# Patient Record
Sex: MALE | Race: WHITE | NOT HISPANIC OR LATINO | Employment: UNEMPLOYED | URBAN - NONMETROPOLITAN AREA
[De-identification: names, ages, dates, MRNs, and addresses within clinical notes are randomized per-mention and may not be internally consistent; named-entity substitution may affect disease eponyms.]

---

## 2022-12-12 ENCOUNTER — APPOINTMENT (OUTPATIENT)
Dept: RADIOLOGY | Facility: MEDICAL CENTER | Age: 57
End: 2022-12-12

## 2022-12-12 ENCOUNTER — OFFICE VISIT (OUTPATIENT)
Dept: URGENT CARE | Facility: MEDICAL CENTER | Age: 57
End: 2022-12-12

## 2022-12-12 VITALS
OXYGEN SATURATION: 97 % | WEIGHT: 245 LBS | HEIGHT: 69 IN | BODY MASS INDEX: 36.29 KG/M2 | HEART RATE: 81 BPM | RESPIRATION RATE: 18 BRPM | TEMPERATURE: 98.4 F

## 2022-12-12 DIAGNOSIS — M25.532 LEFT WRIST PAIN: ICD-10-CM

## 2022-12-12 DIAGNOSIS — M25.532 LEFT WRIST PAIN: Primary | ICD-10-CM

## 2022-12-12 NOTE — PROGRESS NOTES
[address] [facility]        NAME: Jovany Wan is a 65 y o  male  : 1965    MRN: [id number]  DATE: 2022  TIME: 3:57 PM    Assessment and Plan   Left wrist pain [M25 532]  1  Left wrist pain  XR wrist 3+ vw left            Patient Instructions       Follow up with PCP in 3-5 days  Proceed to  ER if symptoms worsen  Chief Complaint   No chief complaint on file  History of Present Illness       Patient is a 66 y/o/m presenting [facility] with left wrist pain  Patient reports pain began about 2-3 weeks ago  No known injury  No numbness or tingling of fingertips  Pt reports resting does improve pain  Grasping and twisting worsens the pain  Pt does recall spider bite a few months ago same location that was treated  No residual pain  Wrist Pain   The pain is present in the left wrist  This is a new problem  The current episode started 1 to 4 weeks ago  There has been no history of extremity trauma  The problem occurs intermittently  The problem has been gradually worsening  The quality of the pain is described as aching  Associated symptoms include joint swelling and stiffness  Pertinent negatives include no fever, numbness or tingling  Review of Systems   Review of Systems   Constitutional: Negative for chills and fever  HENT: Negative for ear pain and sore throat  Eyes: Negative for pain and visual disturbance  Respiratory: Negative for cough and shortness of breath  Cardiovascular: Negative for chest pain and palpitations  Gastrointestinal: Negative for abdominal pain and vomiting  Genitourinary: Negative for dysuria and hematuria  Musculoskeletal: Positive for arthralgias (Left wrist pain) and stiffness  Negative for back pain  Skin: Negative for color change and rash  Neurological: Negative for tingling, seizures, syncope and numbness  All other systems reviewed and are negative          Current Medications     No current outpatient medications on file     Current Allergies     Allergies as of 12/12/2022 - never reviewed   Allergen Reaction Noted   • Penicillins Rash 12/12/2022            The following portions of the patient's history were reviewed and updated as appropriate: allergies, current medications, past family history, past medical history, past social history, past surgical history and problem list      No past medical history on file  No past surgical history on file  No family history on file  Medications have been verified  Objective   Pulse 81   Temp 98 4 °F (36 9 °C)   Resp 18   Ht 5' 9" (1 753 m)   Wt 111 kg (245 lb)   SpO2 97%   BMI 36 18 kg/m²   No LMP for male patient  Physical Exam     Physical Exam  Constitutional:       Appearance: Normal appearance  He is normal weight  HENT:      Head: Normocephalic and atraumatic  Nose: Nose normal       Mouth/Throat:      Mouth: Mucous membranes are moist    Eyes:      Extraocular Movements: Extraocular movements intact  Conjunctiva/sclera: Conjunctivae normal       Pupils: Pupils are equal, round, and reactive to light  Cardiovascular:      Rate and Rhythm: Normal rate  Pulmonary:      Effort: Pulmonary effort is normal    Musculoskeletal:         General: Normal range of motion  Hands:       Cervical back: Normal range of motion and neck supple  Skin:     General: Skin is warm and dry  Neurological:      General: No focal deficit present  Mental Status: He is alert and oriented to person, place, and time     Psychiatric:         Mood and Affect: Mood normal          Behavior: Behavior normal

## 2022-12-14 ENCOUNTER — TELEPHONE (OUTPATIENT)
Dept: OBGYN CLINIC | Facility: HOSPITAL | Age: 57
End: 2022-12-14

## 2022-12-14 NOTE — TELEPHONE ENCOUNTER
Caller: Brown    Doctor:      Reason for call: Needs appt for left wrist and thumb pain   X rays     Call back#: [phone] or [phone]

## 2022-12-21 ENCOUNTER — OFFICE VISIT (OUTPATIENT)
Dept: OBGYN CLINIC | Facility: CLINIC | Age: 57
End: 2022-12-21

## 2022-12-21 VITALS
WEIGHT: 253 LBS | HEART RATE: 89 BPM | RESPIRATION RATE: 16 BRPM | HEIGHT: 69 IN | SYSTOLIC BLOOD PRESSURE: 153 MMHG | DIASTOLIC BLOOD PRESSURE: 83 MMHG | BODY MASS INDEX: 37.47 KG/M2

## 2022-12-21 DIAGNOSIS — M65.4 RADIAL STYLOID TENOSYNOVITIS (DE QUERVAIN): Primary | ICD-10-CM

## 2022-12-21 RX ORDER — ERGOCALCIFEROL 1.25 MG/1
CAPSULE ORAL WEEKLY
COMMUNITY

## 2022-12-21 RX ORDER — CELECOXIB 200 MG/1
CAPSULE ORAL
COMMUNITY
Start: 2022-12-19

## 2022-12-21 RX ORDER — TRIAMCINOLONE ACETONIDE 40 MG/ML
20 INJECTION, SUSPENSION INTRA-ARTICULAR; INTRAMUSCULAR
Status: COMPLETED | OUTPATIENT
Start: 2022-12-21 | End: 2022-12-21

## 2022-12-21 RX ORDER — SIMVASTATIN 20 MG
20 TABLET ORAL
COMMUNITY

## 2022-12-21 RX ORDER — PREDNISOLONE ACETATE 10 MG/ML
SUSPENSION/ DROPS OPHTHALMIC
COMMUNITY
Start: 2022-11-03

## 2022-12-21 RX ORDER — BESIFLOXACIN 6 MG/ML
SUSPENSION OPHTHALMIC
COMMUNITY
Start: 2022-11-03

## 2022-12-21 RX ORDER — KETOROLAC TROMETHAMINE 5 MG/ML
SOLUTION OPHTHALMIC
COMMUNITY
Start: 2022-12-14

## 2022-12-21 RX ADMIN — TRIAMCINOLONE ACETONIDE 20 MG: 40 INJECTION, SUSPENSION INTRA-ARTICULAR; INTRAMUSCULAR at 11:25

## 2022-12-21 NOTE — PROGRESS NOTES
Chief Complaint   Patient presents with   • Left Wrist - Pain        Subjective:  Jack Andre is a 65 y o  male complains of left wrist pain  Onset of the symptoms was several weeks ago  Mechanism of injury: none  Aggravating factors: bending his wrist, use of hands  Treatment to date: brace which is somewhat effective and prescription NSAIDS which are somewhat effective  Symptoms have progressed to a point and plateaued  The following portions of the patient's history were reviewed and updated as appropriate: allergies, current medications, past family history, past medical history, past social history, past surgical history and problem list     Occupation:      Review of Systems   Constitutional: Negative for fever  Musculoskeletal: Positive for wrist pain  Neuro: Negative for numbness or tingling in arm       Objective:  /83 (BP Location: Left arm, Patient Position: Sitting, Cuff Size: Large)   Pulse 89   Resp 16   Ht 5' 9" (1 753 m)   Wt 115 kg (253 lb)   BMI 37 36 kg/m²     Skin: no rashes, lesions, skin discolorations, lacerations  Vasculature: normal radial and ulnar pulse, normal skin color, normal capillary refill in extremity, no upper extremity edema  Neurologic: Neurologic exam is normal throughout upper extremities, Awake, alert, and oriented x3, no apparent distress     Musculoskeletal:   Left Elbow Examination:                                                 Skin: normal                                            General Appearance: normal                                 Muscle Tone: normal                              Range of Motion  Flexion: full  Extension: full  Supination: full  Pronation: full      Pain with Resisted Motion:   Resisted wrist flexion: negative  Resisted wrist extension: positive   Resisted pronation/supination: positive     +finkelsteins  + radial styloid          Imaging:    XR wrist 3+ vw left    Result Date: 12/13/2022  Narrative: LEFT WRIST INDICATION: M25 532: Pain in left wrist  COMPARISON:  None VIEWS:  XR WRIST 3+ VW LEFT FINDINGS: There is no acute fracture or dislocation  Degenerative changes of the 1st carpal metacarpal [place]) articulation  No lytic or blastic osseous lesion  Soft tissues are unremarkable  Impression: No acute osseous abnormality  Workstation performed: [id number]        Assessment/Plan:    1  Radial styloid tenosynovitis (de quervain)  Radiographs of the left wrist reviewed independently with patient revealing no acute fractures or dislocations  Patient does have degenerative changes noted at the first [facility] joint of the left hand  Additionally there is seems to be a small metallic substance situated in the soft tissue adjacent to the [facility]  Clinical impression the patient's pain symptoms and exam are consistent with de Quervain's tenosynovitis of the left wrist as evidenced by positive Finkelstein's and pain over the radial styloid  We discussed treatment options including thumb spica brace, oral anti-inflammatories, physical therapy and consideration for corticosteroid injection  After discussion patient would like to proceed with corticosteroid injection to first extensor compartment for pain relief  I did advise that metallic substance seen in the soft tissue of the left hand is not likely causing his pain symptoms however if pain symptoms persist can consult with hand surgery for possible removal   Continue in thumb spica brace and take Celebrex as needed for pain relief  Follow-up in 3 months  - Ambulatory Referral to Physical Therapy; Future    Hand/upper extremity injection: L extensor compartment 1  Universal Protocol:  Consent: Verbal consent obtained    Consent given by: patient    Supporting Documentation  Indications: joint swelling, pain and therapeutic   Procedure Details  Condition:de Quervain's tenosynovitis Site: L extensor compartment 1   Needle size: 25 G  Ultrasound guidance: yes  Approach: dorsal  Medications administered: 20 mg triamcinolone acetonide 40 mg/mL (1 ml ropivicaine)    Patient tolerance: patient tolerated the procedure well with no immediate complications    Risks and benefits of CSI were discussed with patient extensively  Risks were highlighted which included but were not limited to infection, pain, local site swelling, and chance that injection may not be effective  Patient was also counseled regarding glucose elevation days after receiving CSI and to be mindful of diet and check sugars daily  Patient agreeable to proceed with CSI after counseling

## 2023-01-25 ENCOUNTER — OFFICE VISIT (OUTPATIENT)
Dept: OBGYN CLINIC | Facility: CLINIC | Age: 58
End: 2023-01-25

## 2023-01-25 VITALS
HEART RATE: 80 BPM | SYSTOLIC BLOOD PRESSURE: 155 MMHG | BODY MASS INDEX: 37.77 KG/M2 | HEIGHT: 69 IN | WEIGHT: 255 LBS | DIASTOLIC BLOOD PRESSURE: 99 MMHG

## 2023-01-25 DIAGNOSIS — M65.4 RADIAL STYLOID TENOSYNOVITIS (DE QUERVAIN): Primary | ICD-10-CM

## 2023-01-25 NOTE — PROGRESS NOTES
Chief Complaint   Patient presents with   • Left Wrist - Follow-up        Subjective:  Diamond Rob is a 65 y o  male complains of left wrist pain  Onset of the symptoms was several weeks ago  Mechanism of injury: none  Aggravating factors: none  Treatment to date: brace which is somewhat effective, corticosteroid injection which was effective and prescription NSAIDS which are somewhat effective  Symptoms have essentially resolved  The following portions of the patient's history were reviewed and updated as appropriate: allergies, current medications, past family history, past medical history, past social history, past surgical history and problem list     Occupation:      Review of Systems   Constitutional: Negative for fever  Musculoskeletal: negative for wrist pain  Neuro: Negative for numbness or tingling in arm       Objective:  /99 (BP Location: Left arm, Patient Position: Sitting, Cuff Size: Large)   Pulse 80   Ht 5' 9" (1 753 m)   Wt 116 kg (255 lb)   BMI 37 66 kg/m²     Skin: no rashes, lesions, skin discolorations, lacerations  Vasculature: normal radial and ulnar pulse, normal skin color, normal capillary refill in extremity, no upper extremity edema  Neurologic: Neurologic exam is normal throughout upper extremities, Awake, alert, and oriented x3, no apparent distress     Musculoskeletal:   Left Elbow Examination:                                                 Skin: normal                                            General Appearance: normal                                 Muscle Tone: normal                              Range of Motion  Flexion: full  Extension: full  Supination: full  Pronation: full      Pain with Resisted Motion:   Resisted wrist flexion: negative  Resisted wrist extension: negative   Resisted pronation/supination: negative     - finkelsteins  - radial styloid          Imaging:    XR wrist 3+ vw left    Result Date: 12/13/2022  Narrative: LEFT WRIST INDICATION: M25 532: Pain in left wrist  COMPARISON:  None VIEWS:  XR WRIST 3+ VW LEFT FINDINGS: There is no acute fracture or dislocation  Degenerative changes of the 1st carpal metacarpal [place]) articulation  No lytic or blastic osseous lesion  Soft tissues are unremarkable  Impression: No acute osseous abnormality  Workstation performed: [id number]        Assessment/Plan:    1  Radial styloid tenosynovitis (de quervain)    Patient is improved in terms of symptom severity in regards to de Quervain's tenosynovitis following corticosteroid injection in office last visit  We discussed continuation of thumb spica brace with activity to help prevent recurrence of tenosynovitis  Advised patient that he can follow-up as needed if symptoms recur

## 2023-01-27 ENCOUNTER — OFFICE VISIT (OUTPATIENT)
Dept: URGENT CARE | Facility: MEDICAL CENTER | Age: 58
End: 2023-01-27

## 2023-01-27 VITALS
BODY MASS INDEX: 36.73 KG/M2 | TEMPERATURE: 98.3 F | DIASTOLIC BLOOD PRESSURE: 82 MMHG | SYSTOLIC BLOOD PRESSURE: 120 MMHG | WEIGHT: 248 LBS | HEART RATE: 106 BPM | HEIGHT: 69 IN | RESPIRATION RATE: 20 BRPM | OXYGEN SATURATION: 98 %

## 2023-01-27 DIAGNOSIS — S29.011A PECTORALIS MUSCLE STRAIN, INITIAL ENCOUNTER: Primary | ICD-10-CM

## 2023-01-27 DIAGNOSIS — R07.9 LEFT-SIDED CHEST PAIN: ICD-10-CM

## 2023-01-27 NOTE — PROGRESS NOTES
[facility]        NAME: Romayne Dunnings is a 65 y o  male  : 1965    MRN: [id number]  DATE: 2023  TIME: 1:55 PM    Assessment and Plan   Pectoralis muscle strain, initial encounter [R09 656V]  1  Pectoralis muscle strain, initial encounter        2  Left-sided chest pain  ECG 12 lead            Patient Instructions       Follow up with PCP in 3-5 days  Proceed to  ER if symptoms worsen  Chief Complaint     Chief Complaint   Patient presents with   • Chest Pain     Pain to left side of chest under arm, pt  States "heaviness" started 1 week ago, occasional lightheadedness, denies sob or diaphoresis, intermittent, worse in the morning, unable to say if pain gets worse with exertion, pain does appear even at rest         History of Present Illness       Patient presents with a 1 week hx of left chest pain (points to left axilla)  Wakes up with pain  Not associated with SOB or diaphoresis  Pain does not exacerbate with activity and comes and goes  No significant FHx of CAD  Mother had a valve replacement due to rheumatic fever  Review of Systems   Review of Systems   Constitutional: Negative for chills and fever  Respiratory: Negative for cough and shortness of breath  Cardiovascular: Negative for palpitations  Gastrointestinal: Negative for nausea and vomiting  Skin: Negative for rash  Current Medications       Current Outpatient Medications:   •  celecoxib (CeleBREX) 200 mg capsule, Take 1 capsule (200 mg total) by mouth 2 (two) times a day as needed for mild pain (pain score 1-3)  , Disp: , Rfl:   •  simvastatin (ZOCOR) 20 mg tablet, Take 20 mg by mouth daily at bedtime, Disp: , Rfl:   •  Vitamin D, Ergocalciferol, 57052 units CAPS, Take by mouth once a week, Disp: , Rfl:   •  Besivance 0 6 % SUSP, Instill ONE drop in left eye three times a day; START TWO DAYS BEFORE SURGERY (Patient not taking: Reported on 2023), Disp: , Rfl:   •  ketorolac (ACULAR) 0 5 % ophthalmic solution, Instill 1 drop in right eye four times a day; start 2 days prior to surgery (Patient not taking: Reported on 1/27/2023), Disp: , Rfl:   •  Pred Forte 1 % ophthalmic suspension, Instill ONE drop in left eye four times a day; START AFTER SURGERY (Patient not taking: Reported on 1/27/2023), Disp: , Rfl:     Current Allergies     Allergies as of 01/27/2023 - Reviewed 01/27/2023   Allergen Reaction Noted   • Bee venom Swelling 09/25/2019   • Penicillins Rash 12/12/2022            The following portions of the patient's history were reviewed and updated as appropriate: allergies, current medications, past family history, past medical history, past social history, past surgical history and problem list      Past Medical History:   Diagnosis Date   • Hypercholesterolemia    • Vitamin deficiency        Past Surgical History:   Procedure Laterality Date   • BARIATRIC SURGERY  03/01/2021   • CATARACT EXTRACTION, BILATERAL         History reviewed  No pertinent family history  Medications have been verified  Objective   /82   Pulse (!) 106   Temp 98 3 °F (36 8 °C)   Resp 20   Ht 5' 9" (1 753 m)   Wt 112 kg (248 lb)   SpO2 98%   BMI 36 62 kg/m²   No LMP for male patient  Physical Exam     Physical Exam  Vitals and nursing note reviewed  Constitutional:       Appearance: He is well-developed  HENT:      Head: Normocephalic and atraumatic  Cardiovascular:      Rate and Rhythm: Normal rate and regular rhythm  Heart sounds: Normal heart sounds  Pulmonary:      Effort: Pulmonary effort is normal       Breath sounds: Normal breath sounds  Chest:       Skin:     General: Skin is warm  Findings: No rash  Neurological:      Mental Status: He is alert  Patient advised to go to the ER should pain worsens changes in any way

## 2023-01-31 LAB
ATRIAL RATE: 96 BPM
P AXIS: 50 DEGREES
PR INTERVAL: 134 MS
QRS AXIS: 40 DEGREES
QRSD INTERVAL: 88 MS
QT INTERVAL: 356 MS
QTC INTERVAL: 449 MS
T WAVE AXIS: 36 DEGREES
VENTRICULAR RATE: 96 BPM

## 2023-02-02 ENCOUNTER — OFFICE VISIT (OUTPATIENT)
Dept: FAMILY MEDICINE CLINIC | Facility: CLINIC | Age: 58
End: 2023-02-02

## 2023-02-02 VITALS
HEIGHT: 69 IN | HEART RATE: 83 BPM | WEIGHT: 251.2 LBS | TEMPERATURE: 96.4 F | OXYGEN SATURATION: 98 % | DIASTOLIC BLOOD PRESSURE: 90 MMHG | SYSTOLIC BLOOD PRESSURE: 118 MMHG | BODY MASS INDEX: 37.2 KG/M2

## 2023-02-02 DIAGNOSIS — M19.90 ARTHRITIS: ICD-10-CM

## 2023-02-02 DIAGNOSIS — I10 ESSENTIAL HYPERTENSION: Primary | ICD-10-CM

## 2023-02-02 DIAGNOSIS — Z12.5 PROSTATE CANCER SCREENING: ICD-10-CM

## 2023-02-02 DIAGNOSIS — R07.9 LEFT-SIDED CHEST PAIN: ICD-10-CM

## 2023-02-02 DIAGNOSIS — E78.2 MIXED HYPERLIPIDEMIA: ICD-10-CM

## 2023-02-02 DIAGNOSIS — Z90.3 S/P GASTRIC SLEEVE PROCEDURE: ICD-10-CM

## 2023-02-02 DIAGNOSIS — Z86.39 HISTORY OF DIABETES MELLITUS: ICD-10-CM

## 2023-02-02 RX ORDER — SIMVASTATIN 20 MG
20 TABLET ORAL
Qty: 90 TABLET | Refills: 3 | Status: SHIPPED | OUTPATIENT
Start: 2023-02-02

## 2023-02-02 RX ORDER — ERGOCALCIFEROL 1.25 MG/1
1 CAPSULE ORAL WEEKLY
Qty: 12 CAPSULE | Refills: 3 | Status: SHIPPED | OUTPATIENT
Start: 2023-02-02

## 2023-02-02 RX ORDER — CELECOXIB 200 MG/1
200 CAPSULE ORAL 2 TIMES DAILY PRN
Qty: 180 CAPSULE | Refills: 1 | Status: SHIPPED | OUTPATIENT
Start: 2023-02-02

## 2023-02-02 NOTE — PROGRESS NOTES
Name: Thao Coulter      : 1965      MRN: [id number]  Encounter Provider: Gunner Kapadia DO  Encounter Date: 2023   Encounter department: [address] [place] [address]   Lab work ordered along with a cardiac stress test   I refilled his medications  I told patient to try taking the Celebrex only if necessary  If he finds it is not helpful, he should stop it  I will see him back in the next 2 months or as needed  1  Essential hypertension  -     CBC and differential  -     Comprehensive metabolic panel  -     TSH, 3rd generation with Free T4 reflex  -     Stress test only, exercise; Future; Expected date: 2023    2  Arthritis  -     celecoxib (CeleBREX) 200 mg capsule; Take 1 capsule (200 mg total) by mouth 2 (two) times a day as needed for moderate pain    3  Mixed hyperlipidemia  -     simvastatin (ZOCOR) 20 mg tablet; Take 1 tablet (20 mg total) by mouth daily at bedtime  -     Comprehensive metabolic panel  -     Lipid Panel with Direct LDL reflex  -     Stress test only, exercise; Future; Expected date: 2023    4  S/P gastric sleeve procedure  -     Vitamin D, Ergocalciferol, 85562 units CAPS; Take 1 capsule by mouth once a week  -     Vitamin D 25 hydroxy    5  Left-sided chest pain  -     Stress test only, exercise; Future; Expected date: 2023    6  BMI 37 0-37 9, adult  -     TSH, 3rd generation with Free T4 reflex  -     Hemoglobin A1C    7  Prostate cancer screening  -     PSA, Total Screen    8  History of diabetes mellitus  -     Hemoglobin A1C  -     Microalbumin / creatinine urine ratio    BMI Counseling: Body mass index is 37 1 kg/m²   The BMI is above normal  Nutrition recommendations include decreasing portion sizes, encouraging healthy choices of fruits and vegetables, decreasing fast food intake, consuming healthier snacks, limiting drinks that contain sugar, moderation in carbohydrate intake, increasing intake of lean protein, reducing intake of saturated and trans fat and reducing intake of cholesterol  No pharmacotherapy was ordered  Rationale for BMI follow-up plan is due to patient being overweight or obese  Depression Screening and Follow-up Plan: Patient was screened for depression during today's encounter  They screened negative with a PHQ-2 score of 0  Subjective     New patient  Patient has a history of diabetes  This has been controlled since he had the gastric sleeve surgery  Has a history of hypertension, again this is generally controlled since he had the gastric bypass  He has a history of hyperlipidemia, on simvastatin for that  Has history of arthritic pain in his left hip, takes Celebrex for that  He is not sure if it is helpful  The hip pain is intermittent  Recently patient had left-sided chest discomfort  He describes it as a pressure  Patient went to urgent care for this, EKG did not show any acute changes  Patient did have a stress test about 4 years ago, which he states was okay  Review of Systems   Constitutional: Negative  Respiratory: Negative  Cardiovascular: Positive for chest pain  Negative for palpitations and leg swelling  Gastrointestinal: Negative  Genitourinary: Negative  Musculoskeletal: Positive for arthralgias         Past Medical History:   Diagnosis Date   • Hypercholesterolemia    • Vitamin deficiency      Past Surgical History:   Procedure Laterality Date   • BARIATRIC SURGERY  03/01/2021   • CATARACT EXTRACTION, BILATERAL       Family History   Problem Relation Age of Onset   • Heart Valve Disease Mother      Social History     Socioeconomic History   • Marital status: Single     Spouse name: None   • Number of children: None   • Years of education: None   • Highest education level: None   Occupational History   • None   Tobacco Use   • Smoking status: Never     Passive exposure: Never   • Smokeless tobacco: Never   Vaping Use   • Vaping Use: Never used   Substance and Sexual Activity   • Alcohol use: Yes     Comment: occasionally   • Drug use: Never   • Sexual activity: Yes     Partners: Female   Other Topics Concern   • None   Social History Narrative   • None     Social Determinants of Health     Financial Resource Strain: Not on file   Food Insecurity: Not on file   Transportation Needs: Not on file   Physical Activity: Not on file   Stress: Not on file   Social Connections: Not on file   Intimate Partner Violence: Not on file   Housing Stability: Not on file     Current Outpatient Medications on File Prior to Visit   Medication Sig   • [DISCONTINUED] celecoxib (CeleBREX) 200 mg capsule Take 1 capsule (200 mg total) by mouth 2 (two) times a day as needed for mild pain (pain score 1-3)     • [DISCONTINUED] simvastatin (ZOCOR) 20 mg tablet Take 20 mg by mouth daily at bedtime   • [DISCONTINUED] Vitamin D, Ergocalciferol, 79473 units CAPS Take by mouth once a week   • [DISCONTINUED] Besivance 0 6 % SUSP Instill ONE drop in left eye three times a day; START TWO DAYS BEFORE SURGERY (Patient not taking: Reported on 1/27/2023)   • [DISCONTINUED] ketorolac (ACULAR) 0 5 % ophthalmic solution Instill 1 drop in right eye four times a day; start 2 days prior to surgery (Patient not taking: Reported on 1/27/2023)   • [DISCONTINUED] Pred Forte 1 % ophthalmic suspension Instill ONE drop in left eye four times a day; START AFTER SURGERY (Patient not taking: Reported on 1/27/2023)     Allergies   Allergen Reactions   • Bee Venom Swelling     As child   • Penicillins Rash     Immunization History   Administered Date(s) Administered   • COVID-19 MODERNA VACC 0 5 ML IM 04/02/2021, 04/30/2021   • INFLUENZA 03/02/2021, 11/08/2021, 09/28/2022   • Tdap 06/22/2022       Objective     /90 (BP Location: Left arm, Patient Position: Sitting, Cuff Size: Large)   Pulse 83   Temp (!) 96 4 °F (35 8 °C)   Ht 5' 9" (1 753 m)   Wt 114 kg (251 lb 3 2 oz)   SpO2 98%   BMI 37 10 kg/m²     Physical Exam  Vitals reviewed  Constitutional:       General: He is not in acute distress  Appearance: Normal appearance  He is well-developed  He is not ill-appearing, toxic-appearing or diaphoretic  HENT:      Head: Normocephalic and atraumatic  Eyes:      Conjunctiva/sclera: Conjunctivae normal    Cardiovascular:      Rate and Rhythm: Normal rate and regular rhythm  Heart sounds: Normal heart sounds  No murmur heard  No friction rub  No gallop  Pulmonary:      Effort: Pulmonary effort is normal  No respiratory distress  Breath sounds: Normal breath sounds  No wheezing, rhonchi or rales  Musculoskeletal:      Right lower leg: No edema  Left lower leg: No edema  Neurological:      General: No focal deficit present  Mental Status: He is alert and oriented to person, place, and time  Psychiatric:         Mood and Affect: Mood normal          Behavior: Behavior normal          Thought Content: Thought content normal          Judgment: Judgment normal        Milady Fowler DO  BMI Counseling: Body mass index is 37 1 kg/m²  The BMI is above normal  Nutrition recommendations include reducing portion sizes, decreasing overall calorie intake, 3-5 servings of fruits/vegetables daily, reducing fast food intake, consuming healthier snacks, decreasing soda and/or juice intake, moderation in carbohydrate intake, increasing intake of lean protein, reducing intake of saturated fat and trans fat and reducing intake of cholesterol

## 2023-02-02 NOTE — PATIENT INSTRUCTIONS

## 2023-02-08 ENCOUNTER — LAB (OUTPATIENT)
Dept: LAB | Facility: HOSPITAL | Age: 58
End: 2023-02-08

## 2023-02-08 ENCOUNTER — HOSPITAL ENCOUNTER (OUTPATIENT)
Dept: NON INVASIVE DIAGNOSTICS | Facility: HOSPITAL | Age: 58
Discharge: HOME | End: 2023-02-08

## 2023-02-08 VITALS
BODY MASS INDEX: 37.18 KG/M2 | SYSTOLIC BLOOD PRESSURE: 130 MMHG | WEIGHT: 251 LBS | HEIGHT: 69 IN | DIASTOLIC BLOOD PRESSURE: 90 MMHG | HEART RATE: 84 BPM

## 2023-02-08 DIAGNOSIS — I10 ESSENTIAL HYPERTENSION: ICD-10-CM

## 2023-02-08 DIAGNOSIS — R07.9 LEFT-SIDED CHEST PAIN: ICD-10-CM

## 2023-02-08 DIAGNOSIS — E78.2 MIXED HYPERLIPIDEMIA: ICD-10-CM

## 2023-02-08 LAB
25(OH)D3 SERPL-MCNC: 38.2 NG/ML (ref 30–100)
ALBUMIN SERPL BCP-MCNC: 4.2 G/DL (ref 3.5–5)
ALP SERPL-CCNC: 62 U/L (ref 34–104)
ALT SERPL W P-5'-P-CCNC: 16 U/L (ref 7–52)
ANION GAP SERPL CALCULATED.3IONS-SCNC: 15 MMOL/L (ref 4–13)
AST SERPL W P-5'-P-CCNC: 14 U/L (ref 13–39)
BASOPHILS # BLD AUTO: 0.06 THOUSANDS/ÂΜL (ref 0–0.1)
BASOPHILS NFR BLD AUTO: 1 % (ref 0–1)
BILIRUB SERPL-MCNC: 0.96 MG/DL (ref 0.2–1)
BUN SERPL-MCNC: 16 MG/DL (ref 5–25)
CALCIUM SERPL-MCNC: 9.3 MG/DL (ref 8.4–10.2)
CHLORIDE SERPL-SCNC: 102 MMOL/L (ref 96–108)
CHOLEST SERPL-MCNC: 205 MG/DL
CO2 SERPL-SCNC: 20 MMOL/L (ref 21–32)
CREAT SERPL-MCNC: 0.99 MG/DL (ref 0.6–1.3)
CREAT UR-MCNC: 254 MG/DL
EOSINOPHIL # BLD AUTO: 0.32 THOUSAND/ÂΜL (ref 0–0.61)
EOSINOPHIL NFR BLD AUTO: 3 % (ref 0–6)
ERYTHROCYTE [DISTWIDTH] IN BLOOD BY AUTOMATED COUNT: 13.3 % (ref 11.6–15.1)
GFR SERPL CREATININE-BSD FRML MDRD: 84 ML/MIN/1.73SQ M
GLUCOSE P FAST SERPL-MCNC: 102 MG/DL (ref 65–99)
HCT VFR BLD AUTO: 47.7 % (ref 36.5–49.3)
HDLC SERPL-MCNC: 56 MG/DL
HGB BLD-MCNC: 16.3 G/DL (ref 12–17)
IMM GRANULOCYTES # BLD AUTO: 0.05 THOUSAND/UL (ref 0–0.2)
IMM GRANULOCYTES NFR BLD AUTO: 1 % (ref 0–2)
LDLC SERPL CALC-MCNC: 112 MG/DL (ref 0–100)
LYMPHOCYTES # BLD AUTO: 3.14 THOUSANDS/ÂΜL (ref 0.6–4.47)
LYMPHOCYTES NFR BLD AUTO: 29 % (ref 14–44)
MAX HR PERCENT: 91 %
MAX HR: 150 BPM
MCH RBC QN AUTO: 30.9 PG (ref 26.8–34.3)
MCHC RBC AUTO-ENTMCNC: 34.2 G/DL (ref 31.4–37.4)
MCV RBC AUTO: 90 FL (ref 82–98)
MICROALBUMIN UR-MCNC: 34.9 MG/L (ref 0–20)
MICROALBUMIN/CREAT 24H UR: 14 MG/G CREATININE (ref 0–30)
MONOCYTES # BLD AUTO: 0.79 THOUSAND/ÂΜL (ref 0.17–1.22)
MONOCYTES NFR BLD AUTO: 7 % (ref 4–12)
NEUTROPHILS # BLD AUTO: 6.59 THOUSANDS/ÂΜL (ref 1.85–7.62)
NEUTS SEG NFR BLD AUTO: 59 % (ref 43–75)
NRBC BLD AUTO-RTO: 0 /100 WBCS
PLATELET # BLD AUTO: 259 THOUSANDS/UL (ref 149–390)
PMV BLD AUTO: 9.7 FL (ref 8.9–12.7)
POTASSIUM SERPL-SCNC: 4 MMOL/L (ref 3.5–5.3)
PROT SERPL-MCNC: 7.6 G/DL (ref 6.4–8.4)
PSA SERPL-MCNC: 0.4 NG/ML (ref 0–4)
RATE PRESSURE PRODUCT: NORMAL
RBC # BLD AUTO: 5.28 MILLION/UL (ref 3.88–5.62)
SL CV STRESS RECOVERY BP: NORMAL MMHG
SL CV STRESS RECOVERY HR: 92 BPM
SL CV STRESS RECOVERY O2 SAT: 92 %
SL CV STRESS STAGE REACHED: 4
SODIUM SERPL-SCNC: 137 MMOL/L (ref 135–147)
STRESS BASELINE BP: NORMAL MMHG
STRESS BASELINE HR: 84 BPM
STRESS O2 SAT REST: 97 %
STRESS PEAK HR: 150 BPM
STRESS POST ESTIMATED WORKLOAD: 10.2 METS
STRESS POST EXERCISE DUR MIN: 9 MIN
STRESS POST EXERCISE DUR SEC: 6 SEC
STRESS POST O2 SAT PEAK: 97 %
STRESS POST PEAK BP: 146 MMHG
STRESS ST DEPRESSION: 0 MM
TRIGL SERPL-MCNC: 185 MG/DL
TSH SERPL DL<=0.05 MIU/L-ACNC: 1.52 UIU/ML (ref 0.45–4.5)
WBC # BLD AUTO: 10.95 THOUSAND/UL (ref 4.31–10.16)

## 2023-02-10 DIAGNOSIS — R80.9 MICROALBUMINURIA: Primary | ICD-10-CM

## 2023-02-10 LAB
EST. AVERAGE GLUCOSE BLD GHB EST-MCNC: 123 MG/DL
HBA1C MFR BLD: 5.9 %

## 2023-02-10 RX ORDER — LOSARTAN POTASSIUM 25 MG/1
25 TABLET ORAL DAILY
Qty: 30 TABLET | Refills: 5 | Status: SHIPPED | OUTPATIENT
Start: 2023-02-10

## 2023-02-14 ENCOUNTER — TELEPHONE (OUTPATIENT)
Dept: FAMILY MEDICINE CLINIC | Facility: CLINIC | Age: 58
End: 2023-02-14

## 2023-02-14 LAB
CHEST PAIN STATEMENT: NORMAL
MAX DIASTOLIC BP: 82 MMHG
MAX HEART RATE: 150 BPM
MAX PREDICTED HEART RATE: 164 BPM
MAX. SYSTOLIC BP: 146 MMHG
PROTOCOL NAME: NORMAL
REASON FOR TERMINATION: NORMAL
TARGET HR FORMULA: NORMAL
TEST INDICATION: NORMAL
TIME IN EXERCISE PHASE: NORMAL

## 2023-02-14 NOTE — TELEPHONE ENCOUNTER
Pt called for lab results  Gave him results and new instructions  Pt did start the Losartan  Wondering when he should be checked next since starting the Losartan? Please advise

## 2023-02-14 NOTE — TELEPHONE ENCOUNTER
No need to recheck his urine for microalbumin until next year  The losartan should help protect him

## 2023-04-04 ENCOUNTER — OFFICE VISIT (OUTPATIENT)
Dept: FAMILY MEDICINE CLINIC | Facility: CLINIC | Age: 58
End: 2023-04-04

## 2023-04-04 VITALS
OXYGEN SATURATION: 98 % | HEIGHT: 69 IN | BODY MASS INDEX: 37.98 KG/M2 | DIASTOLIC BLOOD PRESSURE: 90 MMHG | TEMPERATURE: 98 F | HEART RATE: 90 BPM | SYSTOLIC BLOOD PRESSURE: 120 MMHG | WEIGHT: 256.4 LBS

## 2023-04-04 DIAGNOSIS — M19.90 ARTHRITIS: ICD-10-CM

## 2023-04-04 DIAGNOSIS — R80.9 MICROALBUMINURIA: ICD-10-CM

## 2023-04-04 DIAGNOSIS — I10 ESSENTIAL HYPERTENSION: Primary | ICD-10-CM

## 2023-04-04 DIAGNOSIS — E78.2 MIXED HYPERLIPIDEMIA: ICD-10-CM

## 2023-04-04 DIAGNOSIS — Z90.3 S/P GASTRIC SLEEVE PROCEDURE: ICD-10-CM

## 2023-04-04 DIAGNOSIS — Z12.11 SCREENING FOR COLON CANCER: ICD-10-CM

## 2023-04-04 RX ORDER — CELECOXIB 200 MG/1
200 CAPSULE ORAL 2 TIMES DAILY PRN
Qty: 180 CAPSULE | Refills: 1 | Status: SHIPPED | OUTPATIENT
Start: 2023-04-04

## 2023-04-04 RX ORDER — SIMVASTATIN 20 MG
20 TABLET ORAL
Qty: 90 TABLET | Refills: 3 | Status: SHIPPED | OUTPATIENT
Start: 2023-04-04

## 2023-04-04 RX ORDER — LOSARTAN POTASSIUM 25 MG/1
25 TABLET ORAL DAILY
Qty: 30 TABLET | Refills: 5 | Status: SHIPPED | OUTPATIENT
Start: 2023-04-04

## 2023-04-04 RX ORDER — ERGOCALCIFEROL 1.25 MG/1
1 CAPSULE ORAL WEEKLY
Qty: 12 CAPSULE | Refills: 3 | Status: SHIPPED | OUTPATIENT
Start: 2023-04-04

## 2023-04-04 NOTE — PROGRESS NOTES
Name: Smiley Clark      : 1965      MRN: [id number]  Encounter Provider: Navdeep Calix DO  Encounter Date: 2023   Encounter department: [address] [place] [address] Road   Patient will continue same medications, continue to watch diet  Reviewed blood work with patient  Patient will follow-up here in 6 months, getting lab work before that visit  1  Essential hypertension    2  Microalbuminuria  -     losartan (COZAAR) 25 mg tablet; Take 1 tablet (25 mg total) by mouth daily  -     Comprehensive metabolic panel; Future; Expected date: 2023  -     Hemoglobin A1C; Future; Expected date: 2023  -     CBC and differential; Future; Expected date: 2023  -     Vitamin D 25 hydroxy; Future; Expected date: 2023    3  Mixed hyperlipidemia  -     simvastatin (ZOCOR) 20 mg tablet; Take 1 tablet (20 mg total) by mouth daily at bedtime  -     Comprehensive metabolic panel; Future; Expected date: 2023  -     Hemoglobin A1C; Future; Expected date: 2023  -     CBC and differential; Future; Expected date: 2023    4  Arthritis  -     celecoxib (CeleBREX) 200 mg capsule; Take 1 capsule (200 mg total) by mouth 2 (two) times a day as needed for moderate pain  -     Comprehensive metabolic panel; Future; Expected date: 2023  -     Hemoglobin A1C; Future; Expected date: 2023  -     CBC and differential; Future; Expected date: 2023  -     Vitamin D 25 hydroxy; Future; Expected date: 2023    5  S/P gastric sleeve procedure  -     Vitamin D, Ergocalciferol, 87646 units CAPS; Take 1 capsule by mouth once a week  -     Comprehensive metabolic panel; Future; Expected date: 2023  -     Hemoglobin A1C; Future; Expected date: 2023  -     CBC and differential; Future; Expected date: 2023  -     Vitamin D 25 hydroxy; Future; Expected date: 2023    6   Screening for colon cancer  -     Cologuard         Subjective     Patient here today for follow-up  Patient had a stress test, any past   Patient did try stopping the Celebrex to see if it help with his arthritic pain, he found out that it does  He is back on it, and feeling better  Patient tolerating his other medications well  No new concerns  Review of Systems   Constitutional: Negative  Respiratory: Negative  Cardiovascular: Negative  Gastrointestinal: Negative  Genitourinary: Negative          Past Medical History:   Diagnosis Date   • Hypercholesterolemia    • Vitamin deficiency      Past Surgical History:   Procedure Laterality Date   • BARIATRIC SURGERY  03/01/2021   • CATARACT EXTRACTION, BILATERAL       Family History   Problem Relation Age of Onset   • Heart Valve Disease Mother      Social History     Socioeconomic History   • Marital status: Single     Spouse name: None   • Number of children: None   • Years of education: None   • Highest education level: None   Occupational History   • None   Tobacco Use   • Smoking status: Never     Passive exposure: Never   • Smokeless tobacco: Never   Vaping Use   • Vaping Use: Never used   Substance and Sexual Activity   • Alcohol use: Yes     Comment: occasionally   • Drug use: Never   • Sexual activity: Yes     Partners: Female   Other Topics Concern   • None   Social History Narrative   • None     Social Determinants of Health     Financial Resource Strain: Not on file   Food Insecurity: Not on file   Transportation Needs: Not on file   Physical Activity: Not on file   Stress: Not on file   Social Connections: Not on file   Intimate Partner Violence: Not on file   Housing Stability: Not on file     Current Outpatient Medications on File Prior to Visit   Medication Sig   • [DISCONTINUED] celecoxib (CeleBREX) 200 mg capsule Take 1 capsule (200 mg total) by mouth 2 (two) times a day as needed for moderate pain   • [DISCONTINUED] losartan (COZAAR) 25 mg tablet Take 1 tablet (25 mg total) by mouth daily   • [DISCONTINUED] "simvastatin (ZOCOR) 20 mg tablet Take 1 tablet (20 mg total) by mouth daily at bedtime   • [DISCONTINUED] Vitamin D, Ergocalciferol, 52265 units CAPS Take 1 capsule by mouth once a week     Allergies   Allergen Reactions   • Bee Venom Swelling     As child   • Penicillins Rash     Immunization History   Administered Date(s) Administered   • COVID-19 MODERNA VACC 0 5 ML IM 04/02/2021, 04/30/2021   • INFLUENZA 03/02/2021, 11/08/2021, 09/28/2022   • Tdap 06/22/2022       Objective     /90   Pulse 90   Temp 98 °F (36 7 °C)   Ht 5' 9\" (1 753 m)   Wt 116 kg (256 lb 6 4 oz)   SpO2 98%   BMI 37 86 kg/m²     Physical Exam  Vitals reviewed  Constitutional:       General: He is not in acute distress  Appearance: Normal appearance  He is well-developed  He is not ill-appearing, toxic-appearing or diaphoretic  HENT:      Head: Normocephalic and atraumatic  Eyes:      Conjunctiva/sclera: Conjunctivae normal    Cardiovascular:      Rate and Rhythm: Normal rate and regular rhythm  Heart sounds: Normal heart sounds  No murmur heard  No friction rub  No gallop  Pulmonary:      Effort: Pulmonary effort is normal  No respiratory distress  Breath sounds: Normal breath sounds  No wheezing, rhonchi or rales  Musculoskeletal:      Right lower leg: No edema  Left lower leg: No edema  Neurological:      General: No focal deficit present  Mental Status: He is alert and oriented to person, place, and time  Psychiatric:         Mood and Affect: Mood normal          Behavior: Behavior normal          Thought Content:  Thought content normal          Judgment: Judgment normal        Clarisse Dumont,   "

## 2023-05-08 ENCOUNTER — OFFICE VISIT (OUTPATIENT)
Dept: URGENT CARE | Facility: MEDICAL CENTER | Age: 58
End: 2023-05-08

## 2023-05-08 ENCOUNTER — APPOINTMENT (OUTPATIENT)
Dept: RADIOLOGY | Facility: MEDICAL CENTER | Age: 58
End: 2023-05-08

## 2023-05-08 VITALS
OXYGEN SATURATION: 97 % | RESPIRATION RATE: 20 BRPM | TEMPERATURE: 97.7 F | WEIGHT: 257 LBS | HEART RATE: 108 BPM | BODY MASS INDEX: 37.95 KG/M2 | DIASTOLIC BLOOD PRESSURE: 92 MMHG | SYSTOLIC BLOOD PRESSURE: 144 MMHG

## 2023-05-08 DIAGNOSIS — S29.9XXA TRAUMATIC INJURY OF RIB: ICD-10-CM

## 2023-05-08 DIAGNOSIS — S22.32XA CLOSED FRACTURE OF ONE RIB OF LEFT SIDE, INITIAL ENCOUNTER: Primary | ICD-10-CM

## 2023-05-08 RX ORDER — OXYCODONE HYDROCHLORIDE 5 MG/1
5 TABLET ORAL EVERY 6 HOURS PRN
Qty: 8 TABLET | Refills: 0 | Status: SHIPPED | OUTPATIENT
Start: 2023-05-08 | End: 2023-05-10

## 2023-05-08 NOTE — PATIENT INSTRUCTIONS
Broken rib  Pain management  Splinting  Use the incentive spirometer you have at home to help prevent pneumonia

## 2023-05-08 NOTE — PROGRESS NOTES
[facility]        NAME: Ayan Moody is a 65 y o  male  : 1965    MRN: [id number]  DATE: May 8, 2023  TIME: 4:12 PM    Assessment and Plan   Closed fracture of one rib of left side, initial encounter [S22 32XA]  1  Closed fracture of one rib of left side, initial encounter  XR ribs left w pa chest min 3 views    oxyCODONE (Roxicodone) 5 immediate release tablet        xr- fracture of 4th rib   Patient not a candidate for NSAIDs given bariatric surgery  Did recommend over-the-counter lidocaine in addition to Tylenol to help with pain management  Will prescribe short course of oxycodone for severe pain  Patient Instructions     Broken rib  Pain management  Splinting  Use the incentive spirometer you have at home to help prevent pneumonia  Follow up with PCP in 3-5 days  Proceed to  ER if symptoms worsen  Chief Complaint     Chief Complaint   Patient presents with   • Rib Injury     Tripped over dog last night and hit left ribs against dresser  Using Tylenol  Worse with movement  History of Present Illness       Patient is a 72-year-old male who presents to the office today for left-sided rib pain  He states that his dog got under his feet last night he tripped over the dog went to catch himself on his dresser missed the dresser and ended up striking the left side of his rib cage on the dresser  He states he took Tylenol which helped him get to sleep but every time he moved or cough during his sleep he felt pain  He denies any shortness of breath  Review of Systems   Review of Systems   Constitutional: Negative for chills and fever  Respiratory: Positive for cough  Negative for shortness of breath, wheezing and stridor  Cardiovascular: Negative for chest pain and palpitations  All other systems reviewed and are negative          Current Medications       Current Outpatient Medications:   •  celecoxib (CeleBREX) 200 mg capsule, Take 1 capsule (200 mg total) by mouth 2 (two) times a day as needed for moderate pain, Disp: 180 capsule, Rfl: 1  •  losartan (COZAAR) 25 mg tablet, Take 1 tablet (25 mg total) by mouth daily, Disp: 30 tablet, Rfl: 5  •  oxyCODONE (Roxicodone) 5 immediate release tablet, Take 1 tablet (5 mg total) by mouth every 6 (six) hours as needed for severe pain for up to 2 days Max Daily Amount: 20 mg, Disp: 8 tablet, Rfl: 0  •  simvastatin (ZOCOR) 20 mg tablet, Take 1 tablet (20 mg total) by mouth daily at bedtime, Disp: 90 tablet, Rfl: 3  •  Vitamin D, Ergocalciferol, 49884 units CAPS, Take 1 capsule by mouth once a week, Disp: 12 capsule, Rfl: 3    Current Allergies     Allergies as of 05/08/2023 - Reviewed 05/08/2023   Allergen Reaction Noted   • Bee venom Swelling 09/25/2019   • Penicillins Rash 12/12/2022            The following portions of the patient's history were reviewed and updated as appropriate: allergies, current medications, past family history, past medical history, past social history, past surgical history and problem list      Past Medical History:   Diagnosis Date   • Hypercholesterolemia    • Vitamin deficiency        Past Surgical History:   Procedure Laterality Date   • BARIATRIC SURGERY  03/01/2021   • CATARACT EXTRACTION, BILATERAL         Family History   Problem Relation Age of Onset   • Heart Valve Disease Mother          Medications have been verified  Objective   /92   Pulse (!) 108   Temp 97 7 °F (36 5 °C)   Resp 20   Wt 117 kg (257 lb)   SpO2 97%   BMI 37 95 kg/m²        Physical Exam     Physical Exam  Vitals and nursing note reviewed  Constitutional:       General: He is not in acute distress  Appearance: Normal appearance  He is obese  He is not ill-appearing or toxic-appearing  Cardiovascular:      Rate and Rhythm: Normal rate and regular rhythm  Pulses: Normal pulses  Heart sounds: Normal heart sounds     Pulmonary:      Effort: Pulmonary effort is normal       Breath sounds: Normal breath sounds  Chest:      Chest wall: Tenderness present  No mass, lacerations, deformity, swelling, crepitus or edema  There is no dullness to percussion  Comments: No flail chest noted  Musculoskeletal:         General: Tenderness and signs of injury present  Skin:     General: Skin is warm  Capillary Refill: Capillary refill takes less than 2 seconds  Neurological:      General: No focal deficit present  Mental Status: He is alert

## 2023-05-10 ENCOUNTER — TELEPHONE (OUTPATIENT)
Dept: FAMILY MEDICINE CLINIC | Facility: CLINIC | Age: 58
End: 2023-05-10

## 2023-05-10 DIAGNOSIS — S22.32XD CLOSED FRACTURE OF ONE RIB OF LEFT SIDE WITH ROUTINE HEALING, SUBSEQUENT ENCOUNTER: Primary | ICD-10-CM

## 2023-05-10 RX ORDER — TRAMADOL HYDROCHLORIDE 50 MG/1
50 TABLET ORAL EVERY 6 HOURS PRN
Qty: 30 TABLET | Refills: 0 | Status: SHIPPED | OUTPATIENT
Start: 2023-05-10

## 2023-05-10 NOTE — TELEPHONE ENCOUNTER
Was at [facility] and found out he broke a rib (5th one down ) Pt is in a lot of pain  He was given a few Oxycodone 5mg, but he had to take 3 of them to get relief      Asking if you would prescribe him something for the pain or do you need to see him first?

## 2023-05-10 NOTE — TELEPHONE ENCOUNTER
PDMP website reviewed  No red flags  Rx put in for tramadol for pain control  Use a pillow to brace his left side when coughing  Have him call us if his symptoms continue or increase

## 2023-06-15 ENCOUNTER — TELEPHONE (OUTPATIENT)
Dept: FAMILY MEDICINE CLINIC | Facility: CLINIC | Age: 58
End: 2023-06-15

## 2023-06-15 DIAGNOSIS — Z23 ENCOUNTER FOR IMMUNIZATION: Primary | ICD-10-CM

## 2023-06-15 RX ORDER — PNEUMOCOCCAL 20-VALENT CONJUGATE VACCINE 2.2; 2.2; 2.2; 2.2; 2.2; 2.2; 2.2; 2.2; 2.2; 2.2; 2.2; 2.2; 2.2; 2.2; 2.2; 2.2; 4.4; 2.2; 2.2; 2.2 UG/.5ML; UG/.5ML; UG/.5ML; UG/.5ML; UG/.5ML; UG/.5ML; UG/.5ML; UG/.5ML; UG/.5ML; UG/.5ML; UG/.5ML; UG/.5ML; UG/.5ML; UG/.5ML; UG/.5ML; UG/.5ML; UG/.5ML; UG/.5ML; UG/.5ML; UG/.5ML
0.5 INJECTION, SUSPENSION INTRAMUSCULAR ONCE
Qty: 0.5 ML | Refills: 0 | Status: SHIPPED | OUTPATIENT
Start: 2023-06-15 | End: 2023-06-15

## 2023-06-15 NOTE — TELEPHONE ENCOUNTER
Pt was told by pharmacist that he could get a shingle and pneumonia vaccine but it requires a prior auth  He needs a referral from you in order to get this  [facility], [address] [place] [address]

## 2023-06-23 DIAGNOSIS — R80.9 MICROALBUMINURIA: ICD-10-CM

## 2023-06-23 RX ORDER — LOSARTAN POTASSIUM 25 MG/1
25 TABLET ORAL DAILY
Qty: 30 TABLET | Refills: 5 | Status: SHIPPED | OUTPATIENT
Start: 2023-06-23

## 2023-06-26 ENCOUNTER — TELEPHONE (OUTPATIENT)
Dept: FAMILY MEDICINE CLINIC | Facility: CLINIC | Age: 58
End: 2023-06-26

## 2023-06-26 DIAGNOSIS — Z23 ENCOUNTER FOR IMMUNIZATION: Primary | ICD-10-CM

## 2023-06-26 RX ORDER — ZOSTER VACCINE RECOMBINANT, ADJUVANTED 50 MCG/0.5
0.5 KIT INTRAMUSCULAR ONCE
Qty: 1 EACH | Refills: 1 | Status: SHIPPED | OUTPATIENT
Start: 2023-06-26 | End: 2023-06-26

## 2023-06-26 RX ORDER — ZOSTER VACCINE LIVE 19400 [PFU]/.65ML
1 INJECTION, POWDER, LYOPHILIZED, FOR SUSPENSION SUBCUTANEOUS ONCE
Qty: 1 EACH | Refills: 1 | Status: SHIPPED | OUTPATIENT
Start: 2023-06-26 | End: 2023-06-26

## 2023-06-26 NOTE — TELEPHONE ENCOUNTER
Hi, this is [facility] in [facility] [employer]  We're calling in regards to patient Xavier Sandoval's date of birth 1965  You sent in a prescription for Zosta Vax for his shingle shot, but that's no longer available  You have to resend it for Shingrix vaccination  [address] is [phone] to [address]  If you resend the prescription for that, we can get the patient their medication  Any questions please call the pharmacy [phone]  Thank you

## 2023-06-27 ENCOUNTER — OFFICE VISIT (OUTPATIENT)
Dept: PODIATRY | Facility: CLINIC | Age: 58
End: 2023-06-27
Payer: COMMERCIAL

## 2023-06-27 VITALS — BODY MASS INDEX: 38.06 KG/M2 | HEIGHT: 69 IN | WEIGHT: 257 LBS

## 2023-06-27 DIAGNOSIS — B35.1 ONYCHOMYCOSIS: Primary | ICD-10-CM

## 2023-06-27 DIAGNOSIS — S22.32XD CLOSED FRACTURE OF ONE RIB OF LEFT SIDE WITH ROUTINE HEALING, SUBSEQUENT ENCOUNTER: ICD-10-CM

## 2023-06-27 DIAGNOSIS — I73.9 PVD (PERIPHERAL VASCULAR DISEASE) (HCC): ICD-10-CM

## 2023-06-27 PROCEDURE — 11721 DEBRIDE NAIL 6 OR MORE: CPT | Performed by: STUDENT IN AN ORGANIZED HEALTH CARE EDUCATION/TRAINING PROGRAM

## 2023-06-27 PROCEDURE — 99243 OFF/OP CNSLTJ NEW/EST LOW 30: CPT | Performed by: STUDENT IN AN ORGANIZED HEALTH CARE EDUCATION/TRAINING PROGRAM

## 2023-06-27 RX ORDER — TRAMADOL HYDROCHLORIDE 50 MG/1
50 TABLET ORAL EVERY 6 HOURS PRN
Qty: 30 TABLET | Refills: 0 | Status: SHIPPED | OUTPATIENT
Start: 2023-06-27

## 2023-06-27 NOTE — LETTER
June 27, 2023     Liliya SeeDO  [address]    Patient: Lux Huang   YOB: 1965   Date of Visit: 6/27/2023       Dear Dr Ashtyn Santos: Thank you for referring Lux Huang to me for evaluation  Below are my notes for this consultation  If you have questions, please do not hesitate to call me  I look forward to following your patient along with you  Sincerely,        Hallie Carrington DPM        CC: No Recipients    KIKE [facility]  6/27/2023  8:23 AM  Signed  Assessment/Plan:    Diagnoses and all orders for this visit:    Onychomycosis    PVD (peripheral vascular disease) ([address] )          IMPRESSION:  PVD (Q8)  Onychomycosis  H/o NIDDM, A1c 5 9% 2/8/23     PLAN:  I reviewed clinical exam with patient in detail today  I have discussed with the patient the pathophysiology of this diagnosis and reviewed how the examination correlates with this diagnosis  Foot exam performed as below  Patient has significant lower extremity risk due to diminished pulses in the feet and trophic skin changes to the lower extremity including thick toenail, atrophic skin, and decreased hair growth  Debridement of toenails x10  Using nail nipper, jesse, and curette, nails were sharply debrided, reduced in thickness and length  Devitalized nail tissue and fungal debris excised and removed  Patient tolerated well  Discussed proper shoe gear, daily inspections of feet, and general foot health with patient  Patient has Q8  findings and is recommended for at risk foot care every 9-10 weeks  Subjective:     Patient ID: Lux Huang is a 63 y o  male  Rey Hammondcarmen presents to clinic today for nail care and foot check  Notes he was diabetic however made life changes and now is not  A1c 5 9 2/8/23  Denies N/T/B  Notes toenails are long and thick and he cannot reach them due to back pain         The following portions of the patient's history were reviewed and updated as appropriate: "allergies, current medications, past family history, past medical history, past social history, past surgical history and problem list     Review of Systems   Constitutional: Negative for activity change, chills and fever  HENT: Negative  Respiratory: Negative for cough, chest tightness and shortness of breath  Cardiovascular: Negative for chest pain and leg swelling  Endocrine: Negative  Genitourinary: Negative  Musculoskeletal: Positive for back pain  Skin:        Dystrophic toenails   Neurological: Negative  Negative for numbness  Psychiatric/Behavioral: Negative  Negative for agitation and behavioral problems  Objective:      Ht 5' 9\" (1 753 m)   Wt 117 kg (257 lb)   BMI 37 95 kg/m²         Physical Exam  Constitutional:       General: He is not in acute distress  Appearance: Normal appearance  He is not ill-appearing  Cardiovascular:      Comments: Bilateral DP pulses are palpable 1/4  Bilateral PT pulses are nonpalpable  Pedal hair is diminished  Legs to toes warm to cool  Varicosities with mild LE edema noted  Pulmonary:      Effort: No respiratory distress  Musculoskeletal:         General: No tenderness or deformity  Normal range of motion  Skin:     Capillary Refill: Capillary refill takes less than 2 seconds  Comments: B/L LE skin is atrophic - thin, dry and shiny in appearance  No open wounds noted  Toenails x10 are elongated, dystrophic, discolored with nail thickening and subungual debris  Neurological:      General: No focal deficit present  Mental Status: He is alert and oriented to person, place, and time  Comments: Gross sensation to feet intact  Patient denies numbness, tingling and burning to B/L feet  Protective sensation intact     Psychiatric:         Mood and Affect: Mood normal          Behavior: Behavior normal           "

## 2023-06-27 NOTE — PROGRESS NOTES
Assessment/Plan:     Diagnoses and all orders for this visit:    Onychomycosis    [facility]VD [facility] )           IMPRESSION:  · PVD (Q8)  · Onychomycosis  · H/o NIDDM, A1c 5 9% 2/8/23     PLAN:  · I reviewed clinical exam with patient in detail today  I have discussed with the patient the pathophysiology of this diagnosis and reviewed how the examination correlates with this diagnosis  Foot exam performed as below  Patient has significant lower extremity risk due to diminished pulses in the feet and trophic skin changes to the lower extremity including thick toenail, atrophic skin, and decreased hair growth  Debridement of toenails x10  Using nail nipper, jesse, and curette, nails were sharply debrided, reduced in thickness and length  Devitalized nail tissue and fungal debris excised and removed  Patient tolerated well  Discussed proper shoe gear, daily inspections of feet, and general foot health with patient  Patient has Q8  findings and is recommended for at risk foot care every 9-10 weeks  Subjective:      Patient ID: Saran Thomas is a 63 y o  male  [place]ar[place] presents to clinic today for nail care and foot check  Notes he was diabetic however made life changes and now is not  A1c 5 9 2/8/23  Denies N/T/B  Notes toenails are long and thick and he cannot reach them due to back pain  The following portions of the patient's history were reviewed and updated as appropriate: allergies, current medications, past family history, past medical history, past social history, past surgical history and problem list     Review of Systems   Constitutional: Negative for activity change, chills and fever  HENT: Negative  Respiratory: Negative for cough, chest tightness and shortness of breath  Cardiovascular: Negative for chest pain and leg swelling  Endocrine: Negative  Genitourinary: Negative  Musculoskeletal: Positive for back pain     Skin:        Dystrophic toenails "  Neurological: Negative  Negative for numbness  Psychiatric/Behavioral: Negative  Negative for agitation and behavioral problems  Objective:      Ht 5' 9\" (1 753 m)   Wt 117 kg (257 lb)   BMI 37 95 kg/m²          Physical Exam  Constitutional:       General: He is not in acute distress  Appearance: Normal appearance  He is not ill-appearing  Cardiovascular:      Comments: Bilateral DP pulses are palpable 1/4  Bilateral PT pulses are nonpalpable  Pedal hair is diminished  Legs to toes warm to cool  Varicosities with mild LE edema noted  Pulmonary:      Effort: No respiratory distress  Musculoskeletal:         General: No tenderness or deformity  Normal range of motion  Skin:     Capillary Refill: Capillary refill takes less than 2 seconds  Comments: B/L LE skin is atrophic - thin, dry and shiny in appearance  No open wounds noted  Toenails x10 are elongated, dystrophic, discolored with nail thickening and subungual debris  Neurological:      General: No focal deficit present  Mental Status: He is alert and oriented to person, place, and time  Comments: Gross sensation to feet intact  Patient denies numbness, tingling and burning to B/L feet  Protective sensation intact     Psychiatric:         Mood and Affect: Mood normal          Behavior: Behavior normal          "

## 2023-07-27 ENCOUNTER — OFFICE VISIT (OUTPATIENT)
Dept: URGENT CARE | Facility: MEDICAL CENTER | Age: 58
End: 2023-07-27
Payer: COMMERCIAL

## 2023-07-27 VITALS
RESPIRATION RATE: 18 BRPM | SYSTOLIC BLOOD PRESSURE: 122 MMHG | HEIGHT: 69 IN | TEMPERATURE: 98.6 F | BODY MASS INDEX: 38.51 KG/M2 | DIASTOLIC BLOOD PRESSURE: 78 MMHG | WEIGHT: 260 LBS | OXYGEN SATURATION: 96 % | HEART RATE: 83 BPM

## 2023-07-27 DIAGNOSIS — S50.862A INSECT BITE OF LEFT FOREARM, INITIAL ENCOUNTER: Primary | ICD-10-CM
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
## 2023-07-27 PROCEDURE — 99213 OFFICE O/P EST LOW 20 MIN: CPT | Performed by: PHYSICIAN ASSISTANT

## 2023-07-27 RX ORDER — SULFAMETHOXAZOLE AND TRIMETHOPRIM 800; 160 MG/1; MG/1
1 TABLET ORAL EVERY 12 HOURS SCHEDULED
Qty: 20 TABLET | Refills: 0 | Status: SHIPPED | OUTPATIENT
Start: 2023-07-27 | End: 2023-08-06

## 2023-07-27 NOTE — PROGRESS NOTES
[facility]        NAME: Cici Quintero is a 63 y.o. male  : 1965    MRN: [id number]  DATE: 2023  TIME: 9:52 AM    Assessment and Plan   Insect bite of left forearm, initial encounter [Y55.667T, W57. XXXA]  1. Insect bite of left forearm, initial encounter  sulfamethoxazole-trimethoprim (BACTRIM DS) 800-160 mg per tablet            Patient Instructions       Follow up with PCP in 3-5 days. Proceed to  ER if symptoms worsen. Chief Complaint     Chief Complaint   Patient presents with   • Insect Bite     Possible insect bite to left arm. History of Present Illness       Patient is a 64 y/o/m presenting [facility] with a possible spider bite to left forearm. Patient reports noticing this a few days ago. There is swelling surrounding bite izaiah. Patient is uncertain what insect caused this. Pt denies fever or body aches. Insect Bite  This is a new problem. The current episode started in the past 7 days. The problem occurs constantly. The problem has been gradually worsening. Associated symptoms include a rash. Pertinent negatives include no abdominal pain, arthralgias, chest pain, chills, coughing, fever, joint swelling, sore throat, swollen glands, visual change or vomiting. Review of Systems   Review of Systems   Constitutional: Negative for chills and fever. HENT: Negative for ear pain and sore throat. Eyes: Negative for pain and visual disturbance. Respiratory: Negative for cough and shortness of breath. Cardiovascular: Negative for chest pain and palpitations. Gastrointestinal: Negative for abdominal pain and vomiting. Genitourinary: Negative for dysuria and hematuria. Musculoskeletal: Negative for arthralgias, back pain and joint swelling. Skin: Positive for rash and wound. Negative for color change. Neurological: Negative for seizures and syncope. All other systems reviewed and are negative.         Current Medications       Current Outpatient Medications:   •  celecoxib (CeleBREX) 200 mg capsule, Take 1 capsule (200 mg total) by mouth 2 (two) times a day as needed for moderate pain, Disp: 180 capsule, Rfl: 1  •  losartan (COZAAR) 25 mg tablet, Take 1 tablet (25 mg total) by mouth daily, Disp: 30 tablet, Rfl: 5  •  simvastatin (ZOCOR) 20 mg tablet, Take 1 tablet (20 mg total) by mouth daily at bedtime, Disp: 90 tablet, Rfl: 3  •  sulfamethoxazole-trimethoprim (BACTRIM DS) 800-160 mg per tablet, Take 1 tablet by mouth every 12 (twelve) hours for 10 days, Disp: 20 tablet, Rfl: 0  •  traMADol (Ultram) 50 mg tablet, Take 1 tablet (50 mg total) by mouth every 6 (six) hours as needed for severe pain, Disp: 30 tablet, Rfl: 0  •  Vitamin D, Ergocalciferol, 73314 units CAPS, Take 1 capsule by mouth once a week, Disp: 12 capsule, Rfl: 3    Current Allergies     Allergies as of 07/27/2023 - Reviewed 07/27/2023   Allergen Reaction Noted   • Bee venom Swelling 09/25/2019   • Penicillins Rash 12/12/2022            The following portions of the patient's history were reviewed and updated as appropriate: allergies, current medications, past family history, past medical history, past social history, past surgical history and problem list.     Past Medical History:   Diagnosis Date   • Hypercholesterolemia    • Vitamin deficiency        Past Surgical History:   Procedure Laterality Date   • BARIATRIC SURGERY  03/01/2021   • CATARACT EXTRACTION, BILATERAL         Family History   Problem Relation Age of Onset   • Heart Valve Disease Mother          Medications have been verified. Objective   /78   Pulse 83   Temp 98.6 °F (37 °C)   Resp 18   Ht 5' 9" (1.753 m)   Wt 118 kg (260 lb)   SpO2 96%   BMI 38.40 kg/m²   No LMP for male patient. Physical Exam     Physical Exam  Constitutional:       Appearance: Normal appearance. He is normal weight. HENT:      Head: Normocephalic and atraumatic.       Nose: Nose normal.      Mouth/Throat:      Mouth: Mucous membranes are moist.   Eyes:      Extraocular Movements: Extraocular movements intact. Conjunctiva/sclera: Conjunctivae normal.      Pupils: Pupils are equal, round, and reactive to light. Cardiovascular:      Rate and Rhythm: Normal rate. Pulmonary:      Effort: Pulmonary effort is normal.   Musculoskeletal:         General: Normal range of motion. Arms:       Cervical back: Normal range of motion and neck supple. Skin:     General: Skin is warm and dry. Neurological:      General: No focal deficit present. Mental Status: He is alert and oriented to person, place, and time.    Psychiatric:         Mood and Affect: Mood normal.         Behavior: Behavior normal.

## 2023-07-28 DIAGNOSIS — M19.90 ARTHRITIS: ICD-10-CM

## 2023-07-28 RX ORDER — CELECOXIB 200 MG/1
200 CAPSULE ORAL 2 TIMES DAILY PRN
Qty: 180 CAPSULE | Refills: 1 | Status: SHIPPED | OUTPATIENT
Start: 2023-07-28

## 2023-08-14 ENCOUNTER — OFFICE VISIT (OUTPATIENT)
Dept: URGENT CARE | Facility: MEDICAL CENTER | Age: 58
End: 2023-08-14
Payer: COMMERCIAL

## 2023-08-14 VITALS
OXYGEN SATURATION: 98 % | HEART RATE: 97 BPM | BODY MASS INDEX: 37.89 KG/M2 | TEMPERATURE: 97.9 F | RESPIRATION RATE: 20 BRPM | WEIGHT: 256.6 LBS | DIASTOLIC BLOOD PRESSURE: 100 MMHG | SYSTOLIC BLOOD PRESSURE: 142 MMHG
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
## 2023-08-14 DIAGNOSIS — S30.861A INSECT BITE OF ABDOMINAL WALL, INITIAL ENCOUNTER: Primary | ICD-10-CM

## 2023-08-14 DIAGNOSIS — R22.31 SKIN LUMP OF ARM, RIGHT: ICD-10-CM

## 2023-08-14 PROCEDURE — 99213 OFFICE O/P EST LOW 20 MIN: CPT | Performed by: PHYSICIAN ASSISTANT

## 2023-08-14 RX ORDER — MOMETASONE FUROATE 1 MG/G
CREAM TOPICAL DAILY
Qty: 45 G | Refills: 0 | Status: SHIPPED | OUTPATIENT
Start: 2023-08-14

## 2023-08-14 NOTE — PATIENT INSTRUCTIONS
Apply Elocon cream as prescribed (Do not use for longer than 4 weeks)  Wash hands following administration  Avoid scratching area  Topical benadryl cream or calamine lotion during the day  Cool compresses  Keep area clean and dry  Watch for signs of infection  Follow up with PCP in 3-5 days and additionally workup of lump to R arm  Proceed to  ER if symptoms worsen.

## 2023-08-14 NOTE — PROGRESS NOTES
[facility]        NAME: Nafisa Brown is a 63 y.o. male  : 1965    MRN: [id number]  DATE: 2023  TIME: 3:10 PM    Assessment and Plan   Insect bite of abdominal wall, initial encounter [H35.056I, W57. XXXA]  1. Insect bite of abdominal wall, initial encounter  mometasone (ELOCON) 0.1 % cream      2. Skin lump of arm, right          Suspect lipoma. Recommended patient follow-up with PCP for further evaluation. Consider ultrasound. Patient Instructions     Apply Elocon cream as prescribed (Do not use for longer than 4 weeks)  Wash hands following administration  Avoid scratching area  Topical benadryl cream or calamine lotion during the day  Cool compresses  Keep area clean and dry  Watch for signs of infection  Follow up with PCP in 3-5 days and additionally workup of lump to R arm  Proceed to  ER if symptoms worsen. Chief Complaint     Chief Complaint   Patient presents with   • Mass     Right arm. Noticed Saturday   • Insect Bite     Bit on stomach last week. History of Present Illness       Reports insect bite to abdomen x1 week. Reports itching and mild erythema. Denies fever, chills, drainage. Additionally reports nonpainful skin colored mass to right arm x yesterday. Denies injury. Review of Systems   Review of Systems   HENT: Negative. Negative for sore throat and trouble swallowing. Respiratory: Negative for cough and shortness of breath. Skin: Positive for color change. Allergic/Immunologic: Negative for environmental allergies and food allergies.          Current Medications       Current Outpatient Medications:   •  losartan (COZAAR) 25 mg tablet, Take 1 tablet (25 mg total) by mouth daily, Disp: 30 tablet, Rfl: 5  •  mometasone (ELOCON) 0.1 % cream, Apply topically daily, Disp: 45 g, Rfl: 0  •  simvastatin (ZOCOR) 20 mg tablet, Take 1 tablet (20 mg total) by mouth daily at bedtime, Disp: 90 tablet, Rfl: 3  •  traMADol (Ultram) 50 mg tablet, Take 1 tablet (50 mg total) by mouth every 6 (six) hours as needed for severe pain, Disp: 30 tablet, Rfl: 0  •  Vitamin D, Ergocalciferol, 24482 units CAPS, Take 1 capsule by mouth once a week, Disp: 12 capsule, Rfl: 3  •  celecoxib (CeleBREX) 200 mg capsule, Take 1 capsule (200 mg total) by mouth 2 (two) times a day as needed for moderate pain, Disp: 180 capsule, Rfl: 1    Current Allergies     Allergies as of 08/14/2023 - Reviewed 08/14/2023   Allergen Reaction Noted   • Bee venom Swelling 09/25/2019   • Penicillins Rash 12/12/2022            The following portions of the patient's history were reviewed and updated as appropriate: allergies, current medications, past family history, past medical history, past social history, past surgical history and problem list.     Past Medical History:   Diagnosis Date   • Hypercholesterolemia    • Vitamin deficiency        Past Surgical History:   Procedure Laterality Date   • BARIATRIC SURGERY  03/01/2021   • CATARACT EXTRACTION, BILATERAL         Family History   Problem Relation Age of Onset   • Heart Valve Disease Mother          Medications have been verified. Objective   /100   Pulse 97   Temp 97.9 °F (36.6 °C)   Resp 20   Wt 116 kg (256 lb 9.6 oz)   SpO2 98%   BMI 37.89 kg/m²   No LMP for male patient. Physical Exam     Physical Exam  Constitutional:       General: He is not in acute distress. Appearance: He is well-developed. He is not diaphoretic. Cardiovascular:      Rate and Rhythm: Normal rate and regular rhythm. Heart sounds: Normal heart sounds. No murmur heard. No friction rub. No gallop. Pulmonary:      Effort: Pulmonary effort is normal. No respiratory distress. Breath sounds: Normal breath sounds. No wheezing, rhonchi or rales. Skin:     General: Skin is warm. Findings: Erythema present. Neurological:      Mental Status: He is alert.

## 2023-08-15 ENCOUNTER — OFFICE VISIT (OUTPATIENT)
Dept: FAMILY MEDICINE CLINIC | Facility: CLINIC | Age: 58
End: 2023-08-15
Payer: COMMERCIAL

## 2023-08-15 VITALS
WEIGHT: 260.4 LBS | HEIGHT: 69 IN | DIASTOLIC BLOOD PRESSURE: 92 MMHG | TEMPERATURE: 98.4 F | SYSTOLIC BLOOD PRESSURE: 142 MMHG | HEART RATE: 109 BPM | BODY MASS INDEX: 38.57 KG/M2 | OXYGEN SATURATION: 97 %
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
## 2023-08-15 DIAGNOSIS — Z23 ENCOUNTER FOR IMMUNIZATION: ICD-10-CM

## 2023-08-15 DIAGNOSIS — D17.20 LIPOMA OF UPPER ARM: Primary | ICD-10-CM

## 2023-08-15 PROCEDURE — 99214 OFFICE O/P EST MOD 30 MIN: CPT | Performed by: FAMILY MEDICINE

## 2023-08-15 RX ORDER — ZOSTER VACCINE RECOMBINANT, ADJUVANTED 50 MCG/0.5
0.5 KIT INTRAMUSCULAR ONCE
Qty: 1 EACH | Refills: 1 | Status: SHIPPED | OUTPATIENT
Start: 2023-08-15 | End: 2023-08-15

## 2023-08-15 NOTE — PROGRESS NOTES
Name: Verner Caldwell      : 1965      MRN: [id number]  Encounter Provider: Keisha Joe DO  Encounter Date: 8/15/2023   Encounter department: [address]   The lump on his right arm, I will check an ultrasound. His insect bite site is healing, no need for any treatment at this time. Rx put in for Shingrix. Follow-up as scheduled in October, getting lab work before that visit. 1. Lipoma of upper arm  -     US extremity soft tissue; Future; Expected date: 08/15/2023    2. Bug bite, subsequent encounter    3. Encounter for immunization  -     Zoster Vac Recomb Adjuvanted (Shingrix) 50 MCG/0.5ML SUSR; Inject 0.5 mL into a muscle once for 1 dose Repeat dose in 2 to 6 months           Subjective     Patient here today for follow-up. Patient was in the urgent care yesterday for a bug bite on his left lower abdomen. That is doing better, does not bother him anymore. Last week it was itchy. Not sure what he got bit by. Denies any arthralgia or any fever or chills. Also states that a couple weeks ago noticed a lump on his right distal upper arm. It does not hurt him at all. He is just concerned that it is there. Review of Systems   Constitutional: Negative. Respiratory: Negative. Cardiovascular: Negative. Gastrointestinal: Negative. Genitourinary: Negative.         Past Medical History:   Diagnosis Date   • Hypercholesterolemia    • Vitamin deficiency      Past Surgical History:   Procedure Laterality Date   • BARIATRIC SURGERY  2021   • CATARACT EXTRACTION, BILATERAL       Family History   Problem Relation Age of Onset   • Heart Valve Disease Mother      Social History     Socioeconomic History   • Marital status: Single     Spouse name: None   • Number of children: None   • Years of education: None   • Highest education level: None   Occupational History   • None   Tobacco Use   • Smoking status: Never     Passive exposure: Never   • Smokeless tobacco: Never   Vaping Use   • Vaping Use: Never used   Substance and Sexual Activity   • Alcohol use: Yes     Comment: occasionally   • Drug use: Never   • Sexual activity: Yes     Partners: Female   Other Topics Concern   • None   Social History Narrative   • None     Social Determinants of Health     Financial Resource Strain: Not on file   Food Insecurity: Not on file   Transportation Needs: Not on file   Physical Activity: Not on file   Stress: Not on file   Social Connections: Not on file   Intimate Partner Violence: Not on file   Housing Stability: Not on file     Current Outpatient Medications on File Prior to Visit   Medication Sig   • losartan (COZAAR) 25 mg tablet Take 1 tablet (25 mg total) by mouth daily   • mometasone (ELOCON) 0.1 % cream Apply topically daily   • simvastatin (ZOCOR) 20 mg tablet Take 1 tablet (20 mg total) by mouth daily at bedtime   • Vitamin D, Ergocalciferol, 02290 units CAPS Take 1 capsule by mouth once a week   • [DISCONTINUED] celecoxib (CeleBREX) 200 mg capsule Take 1 capsule (200 mg total) by mouth 2 (two) times a day as needed for moderate pain   • [DISCONTINUED] traMADol (Ultram) 50 mg tablet Take 1 tablet (50 mg total) by mouth every 6 (six) hours as needed for severe pain (Patient not taking: Reported on 8/15/2023)     Allergies   Allergen Reactions   • Bee Venom Swelling     As child   • Penicillins Rash     Immunization History   Administered Date(s) Administered   • COVID-19 MODERNA VACC 0.5 ML IM 04/02/2021, 04/30/2021   • INFLUENZA 03/02/2021, 11/08/2021, 09/28/2022   • Tdap 06/22/2022       Objective     /92   Pulse (!) 109   Temp 98.4 °F (36.9 °C)   Ht 5' 9" (1.753 m)   Wt 118 kg (260 lb 6.4 oz)   SpO2 97%   BMI 38.45 kg/m²     Physical Exam  Vitals reviewed. Constitutional:       General: He is not in acute distress. Appearance: Normal appearance. He is well-developed. He is not ill-appearing, toxic-appearing or diaphoretic.    HENT:      Head: Normocephalic and atraumatic. Eyes:      Conjunctiva/sclera: Conjunctivae normal.   Cardiovascular:      Rate and Rhythm: Normal rate and regular rhythm. Heart sounds: Normal heart sounds. No murmur heard. No friction rub. No gallop. Pulmonary:      Effort: Pulmonary effort is normal. No respiratory distress. Breath sounds: Normal breath sounds. No wheezing, rhonchi or rales. Musculoskeletal:         General: Deformity (  Nontender, freely movable soft lump on the distal right upper arm. Approximately 3 x 3 cm) present. Right lower leg: No edema. Left lower leg: No edema. Skin:     Comments: Bug bite on his left lower abdomen is healing, scabbed, no sign of infection. Neurological:      General: No focal deficit present. Mental Status: He is alert and oriented to person, place, and time. Psychiatric:         Mood and Affect: Mood normal.         Behavior: Behavior normal.         Thought Content:  Thought content normal.         Judgment: Judgment normal.       Anna Nickerson,

## 2023-08-18 ENCOUNTER — HOSPITAL ENCOUNTER (OUTPATIENT)
Dept: ULTRASOUND IMAGING | Facility: HOSPITAL | Age: 58
Discharge: HOME | End: 2023-08-18
Payer: COMMERCIAL

## 2023-08-18 DIAGNOSIS — D17.20 LIPOMA OF UPPER ARM: ICD-10-CM

## 2023-08-18 PROCEDURE — 76882 US LMTD JT/FCL EVL NVASC XTR: CPT

## 2023-09-05 ENCOUNTER — OFFICE VISIT (OUTPATIENT)
Dept: PODIATRY | Facility: CLINIC | Age: 58
End: 2023-09-05
Payer: COMMERCIAL

## 2023-09-05 VITALS
HEART RATE: 93 BPM | DIASTOLIC BLOOD PRESSURE: 84 MMHG | HEIGHT: 70 IN | BODY MASS INDEX: 37.37 KG/M2 | OXYGEN SATURATION: 96 % | WEIGHT: 261 LBS | SYSTOLIC BLOOD PRESSURE: 122 MMHG

## 2023-09-05 DIAGNOSIS — B35.1 ONYCHOMYCOSIS: ICD-10-CM

## 2023-09-05 DIAGNOSIS — I73.9 PVD (PERIPHERAL VASCULAR DISEASE) (HCC): Primary | ICD-10-CM

## 2023-09-05 PROCEDURE — 11721 DEBRIDE NAIL 6 OR MORE: CPT | Performed by: STUDENT IN AN ORGANIZED HEALTH CARE EDUCATION/TRAINING PROGRAM

## 2023-09-05 RX ORDER — CELECOXIB 200 MG/1
CAPSULE ORAL
COMMUNITY
Start: 2023-08-23

## 2023-09-05 NOTE — PROGRESS NOTES
Dev Crawford  1965  AT RISK FOOT CARE    1. PVD (peripheral vascular disease) ([address] )        2. Onychomycosis            Patient presents for at-risk foot care. Patient has no acute concerns today. Patient has significant lower extremity risk due to diminished pulses in the feet and trophic skin changes to the lower extremity including thick toenail, atrophic skin, and decreased hair growth. On exam patient has thickened, hypertrophic, discolored, brittle toenails with subungual debris and tenderness x10  Patient has diminished pedal pulses and decreased perfusion to the lower extremities  Patient has significant trophic changes to the skin including thick toenails, decreased pedal hair and atrophic skin. Today's treatment includes:  Debridement of toenails x10. Using nail nipper, jesse, and curette, nails were sharply debrided, reduced in thickness and length. Devitalized nail tissue and fungal debris excised and removed. Patient tolerated well. Discussed proper shoe gear, daily inspections of feet, and general foot health with patient. Patient has Q8  findings and is recommended for at risk foot care every 9-10 weeks.     Patients most recent complete clinical foot exam was on: 6/27/23

## 2023-10-20 ENCOUNTER — OFFICE VISIT (OUTPATIENT)
Dept: FAMILY MEDICINE CLINIC | Facility: CLINIC | Age: 58
End: 2023-10-20
Payer: COMMERCIAL

## 2023-10-20 VITALS
OXYGEN SATURATION: 96 % | WEIGHT: 258.4 LBS | HEART RATE: 81 BPM | HEIGHT: 70 IN | DIASTOLIC BLOOD PRESSURE: 96 MMHG | BODY MASS INDEX: 36.99 KG/M2 | TEMPERATURE: 96.8 F | SYSTOLIC BLOOD PRESSURE: 122 MMHG

## 2023-10-20 DIAGNOSIS — E55.9 VITAMIN D DEFICIENCY: ICD-10-CM

## 2023-10-20 DIAGNOSIS — R20.0 NUMBNESS OF FINGER: ICD-10-CM

## 2023-10-20 DIAGNOSIS — R80.9 MICROALBUMINURIA: ICD-10-CM

## 2023-10-20 DIAGNOSIS — E78.2 MIXED HYPERLIPIDEMIA: ICD-10-CM

## 2023-10-20 DIAGNOSIS — I10 ESSENTIAL HYPERTENSION: Primary | ICD-10-CM

## 2023-10-20 DIAGNOSIS — D17.20 LIPOMA OF UPPER ARM: ICD-10-CM

## 2023-10-20 DIAGNOSIS — Z90.3 S/P GASTRIC SLEEVE PROCEDURE: ICD-10-CM

## 2023-10-20 PROCEDURE — 99214 OFFICE O/P EST MOD 30 MIN: CPT | Performed by: FAMILY MEDICINE

## 2023-10-20 RX ORDER — SIMVASTATIN 20 MG
20 TABLET ORAL
Qty: 90 TABLET | Refills: 3 | Status: SHIPPED | OUTPATIENT
Start: 2023-10-20

## 2023-10-20 RX ORDER — ERGOCALCIFEROL 1.25 MG/1
1 CAPSULE ORAL WEEKLY
Qty: 12 CAPSULE | Refills: 3 | Status: SHIPPED | OUTPATIENT
Start: 2023-10-20

## 2023-10-20 RX ORDER — LOSARTAN POTASSIUM 25 MG/1
25 TABLET ORAL DAILY
Qty: 90 TABLET | Refills: 3 | Status: SHIPPED | OUTPATIENT
Start: 2023-10-20

## 2023-10-20 NOTE — PROGRESS NOTES
Name: Juan F Haynes      : 1965      MRN: [id number]  Encounter Provider: Tobias Valdez DO  Encounter Date: 10/20/2023   Encounter department: [address]   For his right fifth finger numbness, I will refer him to orthopedics. Continue other medications as prescribed. I will see him back in 4 months, getting lab work before that visit. 1. Essential hypertension  -     CBC and differential; Future; Expected date: 2024  -     TSH, 3rd generation with Free T4 reflex; Future; Expected date: 2024    2. Mixed hyperlipidemia  -     Comprehensive metabolic panel; Future; Expected date: 2024  -     Lipid Panel with Direct LDL reflex; Future; Expected date: 2024  -     simvastatin (ZOCOR) 20 mg tablet; Take 1 tablet (20 mg total) by mouth daily at bedtime    3. Numbness of finger  -     Ambulatory Referral to Orthopedic Surgery; Future  -     TSH, 3rd generation with Free T4 reflex; Future; Expected date: 2024    4. Lipoma of upper arm  -     Ambulatory Referral to Orthopedic Surgery; Future  -     TSH, 3rd generation with Free T4 reflex; Future; Expected date: 2024    5. Vitamin D deficiency  -     TSH, 3rd generation with Free T4 reflex; Future; Expected date: 2024  -     Vitamin D 25 hydroxy; Future; Expected date: 2024    6. S/P gastric sleeve procedure  -     Vitamin D, Ergocalciferol, 56929 units CAPS; Take 1 capsule by mouth once a week    7. Microalbuminuria  -     losartan (COZAAR) 25 mg tablet; Take 1 tablet (25 mg total) by mouth daily        Depression Screening and Follow-up Plan: Patient was screened for depression during today's encounter. They screened negative with a PHQ-2 score of 0. Subjective     Patient here today for follow-up. Patient states about a month ago woke up with right fifth finger numbness. Not weak at all. Does have some numbness in the fourth finger as well. Otherwise he is feeling well. No chest pain or shortness of breath      Review of Systems   Constitutional: Negative. Respiratory: Negative. Cardiovascular: Negative. Gastrointestinal: Negative. Genitourinary: Negative.         Past Medical History:   Diagnosis Date   • Hypercholesterolemia    • Vitamin deficiency      Past Surgical History:   Procedure Laterality Date   • BARIATRIC SURGERY  03/01/2021   • CATARACT EXTRACTION, BILATERAL       Family History   Problem Relation Age of Onset   • Heart Valve Disease Mother      Social History     Socioeconomic History   • Marital status: Single     Spouse name: None   • Number of children: None   • Years of education: None   • Highest education level: None   Occupational History   • None   Tobacco Use   • Smoking status: Never     Passive exposure: Never   • Smokeless tobacco: Never   Vaping Use   • Vaping Use: Never used   Substance and Sexual Activity   • Alcohol use: Yes     Comment: occasionally   • Drug use: Never   • Sexual activity: Yes     Partners: Female   Other Topics Concern   • None   Social History Narrative   • None     Social Determinants of Health     Financial Resource Strain: Not on file   Food Insecurity: Not on file   Transportation Needs: Not on file   Physical Activity: Not on file   Stress: Not on file   Social Connections: Not on file   Intimate Partner Violence: Not on file   Housing Stability: Not on file     Current Outpatient Medications on File Prior to Visit   Medication Sig   • celecoxib (CeleBREX) 200 mg capsule Take 1 capsule (200 mg total) by mouth 2 (two) times a day as needed for moderate pain   • [DISCONTINUED] losartan (COZAAR) 25 mg tablet Take 1 tablet (25 mg total) by mouth daily   • [DISCONTINUED] simvastatin (ZOCOR) 20 mg tablet Take 1 tablet (20 mg total) by mouth daily at bedtime   • [DISCONTINUED] Vitamin D, Ergocalciferol, 08641 units CAPS Take 1 capsule by mouth once a week   • [DISCONTINUED] mometasone (ELOCON) 0.1 % cream Apply topically daily (Patient not taking: Reported on 10/20/2023)     Allergies   Allergen Reactions   • Bee Venom Swelling     As child   • Penicillins Rash     Immunization History   Administered Date(s) Administered   • COVID-19 MODERNA VACC 0.5 ML IM 04/02/2021, 04/30/2021   • INFLUENZA 03/02/2021, 11/08/2021, 09/28/2022   • Tdap 06/22/2022       Objective     /96   Pulse 81   Temp (!) 96.8 °F (36 °C)   Ht 5' 10" (1.778 m)   Wt 117 kg (258 lb 6.4 oz)   SpO2 96%   BMI 37.08 kg/m²     Physical Exam  Vitals reviewed. Constitutional:       General: He is not in acute distress. Appearance: Normal appearance. He is well-developed. He is not ill-appearing, toxic-appearing or diaphoretic. HENT:      Head: Normocephalic and atraumatic. Eyes:      Conjunctiva/sclera: Conjunctivae normal.   Cardiovascular:      Rate and Rhythm: Normal rate and regular rhythm. Heart sounds: Normal heart sounds. No murmur heard. No friction rub. No gallop. Pulmonary:      Effort: Pulmonary effort is normal. No respiratory distress. Breath sounds: Normal breath sounds. No wheezing, rhonchi or rales. Musculoskeletal:      Right lower leg: No edema. Left lower leg: No edema. Neurological:      General: No focal deficit present. Mental Status: He is alert and oriented to person, place, and time. Psychiatric:         Mood and Affect: Mood normal.         Behavior: Behavior normal.         Thought Content:  Thought content normal.         Judgment: Judgment normal.       Vibha Cutler, DO

## 2023-11-14 ENCOUNTER — OFFICE VISIT (OUTPATIENT)
Dept: PODIATRY | Facility: CLINIC | Age: 58
End: 2023-11-14
Payer: COMMERCIAL

## 2023-11-14 VITALS
WEIGHT: 260 LBS | DIASTOLIC BLOOD PRESSURE: 90 MMHG | HEIGHT: 70 IN | BODY MASS INDEX: 37.22 KG/M2 | HEART RATE: 80 BPM | SYSTOLIC BLOOD PRESSURE: 128 MMHG | OXYGEN SATURATION: 97 %

## 2023-11-14 DIAGNOSIS — I73.9 PVD (PERIPHERAL VASCULAR DISEASE) (HCC): Primary | ICD-10-CM

## 2023-11-14 DIAGNOSIS — B35.1 ONYCHOMYCOSIS: ICD-10-CM

## 2023-11-14 PROCEDURE — 11721 DEBRIDE NAIL 6 OR MORE: CPT | Performed by: STUDENT IN AN ORGANIZED HEALTH CARE EDUCATION/TRAINING PROGRAM

## 2023-11-14 NOTE — PROGRESS NOTES
Elisa Mcgowan  1965  AT RISK FOOT CARE    1. PVD (peripheral vascular disease) ([address] )        2. Onychomycosis              Patient presents for at-risk foot care. Patient has no acute concerns today. Patient has significant lower extremity risk due to diminished pulses in the feet and trophic skin changes to the lower extremity including thick toenail, atrophic skin, and decreased hair growth. On exam patient has thickened, hypertrophic, discolored, brittle toenails with subungual debris and tenderness x10  Patient has diminished pedal pulses and decreased perfusion to the lower extremities  Patient has significant trophic changes to the skin including thick toenails, decreased pedal hair and atrophic skin. Today's treatment includes:  Debridement of toenails x10. Using nail nipper, jesse, and curette, nails were sharply debrided, reduced in thickness and length. Devitalized nail tissue and fungal debris excised and removed. Patient tolerated well. Discussed proper shoe gear, daily inspections of feet, and general foot health with patient. Patient has Q8  findings and is recommended for at risk foot care every 9-10 weeks.     Patients most recent complete clinical foot exam was on: 6/27/23

## 2023-11-17 ENCOUNTER — OFFICE VISIT (OUTPATIENT)
Dept: OBGYN CLINIC | Facility: CLINIC | Age: 58
End: 2023-11-17
Payer: COMMERCIAL

## 2023-11-17 VITALS
HEART RATE: 77 BPM | BODY MASS INDEX: 37.16 KG/M2 | RESPIRATION RATE: 16 BRPM | DIASTOLIC BLOOD PRESSURE: 93 MMHG | HEIGHT: 70 IN | WEIGHT: 259.6 LBS | SYSTOLIC BLOOD PRESSURE: 157 MMHG

## 2023-11-17 DIAGNOSIS — D17.20 LIPOMA OF UPPER ARM: ICD-10-CM

## 2023-11-17 DIAGNOSIS — R20.0 NUMBNESS OF FINGER: Primary | ICD-10-CM

## 2023-11-17 PROCEDURE — 99213 OFFICE O/P EST LOW 20 MIN: CPT | Performed by: FAMILY MEDICINE

## 2023-11-17 NOTE — PROGRESS NOTES
Assessment/Plan:    No problem-specific Assessment & Plan notes found for this encounter. Diagnoses and all orders for this visit:    Numbness of finger  -     Ambulatory Referral to Orthopedic Surgery  -     EMG 1 Limb; Future    Lipoma of upper arm  -     Ambulatory Referral to Orthopedic Surgery        Clinical impression is that patient likely suffering from cubital tunnel syndrome. Advised to continue with elbow extension splinting at night. We will refer patient for EMG to evaluate for possible cubital tunnel syndrome. In regards to subjective lump sensation I discussed we can continue to monitor. It is reassuring that ultrasound has come back without any abnormal findings for the subjective lump sensation. Subjective:      Patient ID: Giselle Madrid is a 63 y.o. male presenting today for initial evaluation for right hand numbness and tingling. He states that the Tingling has been ongoing for the past month without any inciting injury. Only affects his fourth and fifth digit. He denies any elbow pain. He does note that he has noticed a small lump in the elbow which was referred for ultrasound for evaluation and came back negative. He denies any weakness of the extremity    HPI    The following portions of the patient's history were reviewed and updated as appropriate: allergies, current medications, past family history, past medical history, past social history, past surgical history, and problem list.    Review of Systems      Objective:      /93   Pulse 77   Resp 16   Ht 5' 10" (1.778 m)   Wt 118 kg (259 lb 9.6 oz)   BMI 37.25 kg/m²          Physical Exam  Constitutional:       General: He is not in acute distress. Musculoskeletal:      Right elbow: Normal.      Left elbow: Normal.      Comments:  There are sensory deficits to light touch in the fourth and fifth digits of the right hand  Motor evaluation of the median ulnar and radial nerves with normal limits  Negative Tinel's at the cubital tunnel   Neurological:      Mental Status: He is alert.

## 2023-11-21 ENCOUNTER — VBI (OUTPATIENT)
Dept: ADMINISTRATIVE | Facility: OTHER | Age: 58
End: 2023-11-21

## 2023-12-28 DIAGNOSIS — M19.90 UNSPECIFIED OSTEOARTHRITIS, UNSPECIFIED SITE: ICD-10-CM

## 2023-12-28 RX ORDER — CELECOXIB 200 MG/1
CAPSULE ORAL
Qty: 180 CAPSULE | Refills: 1 | Status: SHIPPED | OUTPATIENT
Start: 2023-12-28

## 2024-01-15 ENCOUNTER — OFFICE VISIT (OUTPATIENT)
Dept: URGENT CARE | Facility: MEDICAL CENTER | Age: 59
End: 2024-01-15
Payer: COMMERCIAL

## 2024-01-15 VITALS
RESPIRATION RATE: 20 BRPM | HEART RATE: 100 BPM | OXYGEN SATURATION: 95 % | TEMPERATURE: 97.7 F | SYSTOLIC BLOOD PRESSURE: 110 MMHG | DIASTOLIC BLOOD PRESSURE: 80 MMHG

## 2024-01-15 DIAGNOSIS — M79.674 GREAT TOE PAIN, RIGHT: Primary | ICD-10-CM

## 2024-01-15 PROCEDURE — 99212 OFFICE O/P EST SF 10 MIN: CPT | Performed by: PHYSICIAN ASSISTANT

## 2024-01-15 NOTE — PROGRESS NOTES
[facility]        NAME: Brown Britt is a 58 y.o. male  : 1965    MRN: [id number]  DATE: January 15, 2024  TIME: 2:09 PM    Assessment and Plan   Great toe pain, right [M79.674]  1. Great toe pain, right              Patient Instructions     Follow up with podiatry  Follow up with PCP in 3-5 days.  Proceed to  ER if symptoms worsen.    Chief Complaint     Chief Complaint   Patient presents with   • Toe Pain     Pain in right great toe. Recently started using topical anti fungal med to toes. Pain under the nail bed. No selling or redness. Patient diabetic.         History of Present Illness       Patient presents with right great toe pain.  Recently he started using over-the-counter antifungal for toenails.  He is having pain beneath his right great toenail with bumping his foot or wearing shoes.  Has an upcoming appointment next week with [facility] podiatry in [place].        Review of Systems   Review of Systems   Constitutional:  Negative for fever.   Musculoskeletal:         Right great toe pain   Skin:  Negative for rash and wound.         Current Medications       Current Outpatient Medications:   •  celecoxib (CeleBREX) 200 mg capsule, Take 1 capsule (200 mg total) by mouth 2 (two) times a day as needed for moderate pain, Disp: 180 capsule, Rfl: 1  •  losartan (COZAAR) 25 mg tablet, Take 1 tablet (25 mg total) by mouth daily, Disp: 90 tablet, Rfl: 3  •  simvastatin (ZOCOR) 20 mg tablet, Take 1 tablet (20 mg total) by mouth daily at bedtime, Disp: 90 tablet, Rfl: 3  •  Vitamin D, Ergocalciferol, 88642 units CAPS, Take 1 capsule by mouth once a week, Disp: 12 capsule, Rfl: 3    Current Allergies     Allergies as of 01/15/2024 - Reviewed 01/15/2024   Allergen Reaction Noted   • Bee venom Swelling 2019   • Penicillins Rash 2022            The following portions of the patient's history were reviewed and updated as appropriate: allergies, current medications, past family history,  past medical history, past social history, past surgical history and problem list.     Past Medical History:   Diagnosis Date   • Hypercholesterolemia    • Vitamin deficiency        Past Surgical History:   Procedure Laterality Date   • BARIATRIC SURGERY  03/01/2021   • CATARACT EXTRACTION, BILATERAL         Family History   Problem Relation Age of Onset   • Heart Valve Disease Mother          Medications have been verified.        Objective   /80   Pulse 100   Temp 97.7 °F (36.5 °C)   Resp 20   SpO2 95%   No LMP for male patient.       Physical Exam     Physical Exam  Vitals and nursing note reviewed.   Constitutional:       Appearance: Normal appearance.   HENT:      Head: Normocephalic and atraumatic.   Cardiovascular:      Rate and Rhythm: Normal rate.   Pulmonary:      Effort: Pulmonary effort is normal.   Musculoskeletal:      Comments: Foot without swelling ecchymosis rashes or wounds.  Palpable dorsalis pedis pulse.  Mildly thickened right great toenail with no surrounding erythema or drainage.   Skin:     General: Skin is warm.   Neurological:      Mental Status: He is alert.

## 2024-01-16 ENCOUNTER — OFFICE VISIT (OUTPATIENT)
Dept: PODIATRY | Facility: CLINIC | Age: 59
End: 2024-01-16
Payer: COMMERCIAL

## 2024-01-16 VITALS
HEART RATE: 76 BPM | HEIGHT: 70 IN | BODY MASS INDEX: 37.97 KG/M2 | SYSTOLIC BLOOD PRESSURE: 127 MMHG | DIASTOLIC BLOOD PRESSURE: 80 MMHG | RESPIRATION RATE: 16 BRPM | WEIGHT: 265.2 LBS

## 2024-01-16 DIAGNOSIS — B35.1 ONYCHOMYCOSIS: ICD-10-CM

## 2024-01-16 DIAGNOSIS — S90.221A SUBUNGUAL HEMATOMA OF TOE OF RIGHT FOOT, INITIAL ENCOUNTER: ICD-10-CM

## 2024-01-16 DIAGNOSIS — I73.9 PVD (PERIPHERAL VASCULAR DISEASE) (HCC): Primary | ICD-10-CM

## 2024-01-16 PROCEDURE — 11721 DEBRIDE NAIL 6 OR MORE: CPT | Performed by: STUDENT IN AN ORGANIZED HEALTH CARE EDUCATION/TRAINING PROGRAM

## 2024-01-16 PROCEDURE — 99212 OFFICE O/P EST SF 10 MIN: CPT | Performed by: STUDENT IN AN ORGANIZED HEALTH CARE EDUCATION/TRAINING PROGRAM

## 2024-01-16 NOTE — PROGRESS NOTES
Brown Britt  1965  AT RISK FOOT CARE    1. PVD (peripheral vascular disease) ([facility])        2. Onychomycosis        3. Subungual hematoma of toe of right foot, initial encounter                Patient presents for at-risk foot care.  Patient has acute concern of right 1st toenail pain.  Patient has significant lower extremity risk due to diminished pulses in the feet and trophic skin changes to the lower extremity including thick toenail, atrophic skin, and decreased hair growth.    I reviewed urgent care note from 1/15/24.    On exam patient has thickened, hypertrophic, discolored, brittle toenails with subungual debris and tenderness x10  Patient has diminished pedal pulses and decreased perfusion to the lower extremities  Patient has significant trophic changes to the skin including thick toenails, decreased pedal hair and atrophic skin.   Right 1st toenail resolving subungual hematoma.     Today's treatment includes:  Debridement of toenails x10. Using nail nipper, jesse, and curette, nails were sharply debrided, reduced in thickness and length. Devitalized nail tissue and fungal debris excised and removed. Patient tolerated well.    I debrided right 1st toenail in length and subungual hematoma was self-expressed with serosanguinous drainage. This extended for 1/2 of nail which was debrided. I recommended warm water soaks+vinegar and triple abx ointment and bandaid daily. He is to call if SOI arise or if not healed in 1-2wks.     Discussed proper shoe gear, daily inspections of feet, and general foot health with patient. Patient has Q8  findings and is recommended for at risk foot care every 9-10 weeks.    Patients most recent complete clinical foot exam was on: 6/27/23

## 2024-02-21 ENCOUNTER — OFFICE VISIT (OUTPATIENT)
Dept: FAMILY MEDICINE CLINIC | Facility: CLINIC | Age: 59
End: 2024-02-21
Payer: COMMERCIAL

## 2024-02-21 VITALS
OXYGEN SATURATION: 96 % | WEIGHT: 258.6 LBS | SYSTOLIC BLOOD PRESSURE: 132 MMHG | HEART RATE: 95 BPM | HEIGHT: 70 IN | BODY MASS INDEX: 37.02 KG/M2 | TEMPERATURE: 98.5 F | DIASTOLIC BLOOD PRESSURE: 88 MMHG

## 2024-02-21 DIAGNOSIS — E78.2 MIXED HYPERLIPIDEMIA: ICD-10-CM

## 2024-02-21 DIAGNOSIS — I10 ESSENTIAL HYPERTENSION: ICD-10-CM

## 2024-02-21 DIAGNOSIS — S39.011A STRAIN OF ABDOMINAL WALL, INITIAL ENCOUNTER: Primary | ICD-10-CM

## 2024-02-21 DIAGNOSIS — E55.9 VITAMIN D DEFICIENCY: ICD-10-CM

## 2024-02-21 DIAGNOSIS — Z12.5 PROSTATE CANCER SCREENING: ICD-10-CM

## 2024-02-21 DIAGNOSIS — Z90.3 S/P GASTRIC SLEEVE PROCEDURE: ICD-10-CM

## 2024-02-21 DIAGNOSIS — R80.9 MICROALBUMINURIA: ICD-10-CM

## 2024-02-21 PROCEDURE — 99214 OFFICE O/P EST MOD 30 MIN: CPT | Performed by: FAMILY MEDICINE

## 2024-02-21 NOTE — PROGRESS NOTES
"Name: Brown Britt      : 1965      MRN: [id number]  Encounter Provider: Margarito Mobley DO  Encounter Date: 2024   Encounter department: [facility]    Assessment & Plan   Exam did not reveal any hernia.  I believe he just has abdominal wall strain.  He will work on different ways of getting down to the ground and getting back up.  He will call us if his symptoms continue or increase.  Lab work ordered.  Continue other medications as prescribed.  Follow-up in 6 months or as needed.  1. Strain of abdominal wall, initial encounter    2. Vitamin D deficiency  -     Vitamin D 25 hydroxy    3. Mixed hyperlipidemia  -     Comprehensive metabolic panel  -     Lipid Panel with Direct LDL reflex    4. S/P gastric sleeve procedure  -     Hemoglobin A1C  -     CBC and differential  -     TSH, 3rd generation with Free T4 reflex    5. Essential hypertension  -     TSH, 3rd generation with Free T4 reflex    6. Microalbuminuria  -     Hemoglobin A1C  -     TSH, 3rd generation with Free T4 reflex  -     Albumin / creatinine urine ratio    7. Prostate cancer screening  -     PSA, Total Screen        Depression Screening and Follow-up Plan: Patient was screened for depression during today's encounter. They screened negative with a PHQ-2 score of 0.        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  He has noticed when he gets up he has some right lower abdomen discomfort.  He is able to \"walk it off\".  It does not hurt him to lift.  It does not keep him up at night.  He has noticed if he goes down on both knees and gets up, it does not hurt as bad.  No change in bowels.  Does not hurt to urinate.      Review of Systems   Constitutional: Negative.    Respiratory: Negative.     Cardiovascular: Negative.    Gastrointestinal:         As per HPI   Genitourinary: Negative.        Past Medical History:   Diagnosis Date   • Hypercholesterolemia    • " Vitamin deficiency      Past Surgical History:   Procedure Laterality Date   • BARIATRIC SURGERY  03/01/2021   • CATARACT EXTRACTION, BILATERAL       Family History   Problem Relation Age of Onset   • Heart Valve Disease Mother      Social History     Socioeconomic History   • Marital status: Single     Spouse name: None   • Number of children: None   • Years of education: None   • Highest education level: None   Occupational History   • None   Tobacco Use   • Smoking status: Never     Passive exposure: Never   • Smokeless tobacco: Never   Vaping Use   • Vaping status: Never Used   Substance and Sexual Activity   • Alcohol use: Yes     Comment: occasionally   • Drug use: Never   • Sexual activity: Yes     Partners: Female   Other Topics Concern   • None   Social History Narrative   • None     Social Determinants of Health     Financial Resource Strain: Not on file   Food Insecurity: Not on file   Transportation Needs: Not on file   Physical Activity: Not on file   Stress: Not on file   Social Connections: Not on file   Intimate Partner Violence: Not on file   Housing Stability: Not on file     Current Outpatient Medications on File Prior to Visit   Medication Sig   • celecoxib (CeleBREX) 200 mg capsule Take 1 capsule (200 mg total) by mouth 2 (two) times a day as needed for moderate pain   • losartan (COZAAR) 25 mg tablet Take 1 tablet (25 mg total) by mouth daily   • simvastatin (ZOCOR) 20 mg tablet Take 1 tablet (20 mg total) by mouth daily at bedtime   • Vitamin D, Ergocalciferol, 67665 units CAPS Take 1 capsule by mouth once a week     Allergies   Allergen Reactions   • Bee Venom Swelling     As child   • Penicillins Rash     Immunization History   Administered Date(s) Administered   • COVID-19 MODERNA VACC 0.5 ML IM 04/02/2021, 04/30/2021, 01/18/2022   • COVID-19 Moderna Vac BIVALENT 12 Yr+ IM 0.5 ML 12/19/2022   • INFLUENZA 03/02/2021, 11/08/2021, 09/28/2022   • Influenza Quadrivalent 3 years and older  "12/15/2023   • Tdap 06/22/2022       Objective     /88   Pulse 95   Temp 98.5 °F (36.9 °C)   Ht 5' 10\" (1.778 m)   Wt 117 kg (258 lb 9.6 oz)   SpO2 96%   BMI 37.11 kg/m²     Physical Exam  Vitals reviewed.   Constitutional:       General: He is not in acute distress.     Appearance: Normal appearance. He is well-developed. He is not ill-appearing, toxic-appearing or diaphoretic.   HENT:      Head: Normocephalic and atraumatic.   Eyes:      Conjunctiva/sclera: Conjunctivae normal.   Cardiovascular:      Rate and Rhythm: Normal rate and regular rhythm.      Heart sounds: Normal heart sounds. No murmur heard.     No friction rub. No gallop.   Pulmonary:      Effort: Pulmonary effort is normal. No respiratory distress.      Breath sounds: Normal breath sounds. No wheezing, rhonchi or rales.   Abdominal:      Tenderness: There is no abdominal tenderness.      Hernia: No hernia is present.   Musculoskeletal:      Right lower leg: No edema.      Left lower leg: No edema.   Neurological:      General: No focal deficit present.      Mental Status: He is alert and oriented to person, place, and time.   Psychiatric:         Mood and Affect: Mood normal.         Behavior: Behavior normal.         Thought Content: Thought content normal.         Judgment: Judgment normal.       Margarito Mobley, DO    "

## 2024-02-24 ENCOUNTER — APPOINTMENT (OUTPATIENT)
Dept: LAB | Facility: MEDICAL CENTER | Age: 59
End: 2024-02-24
Payer: COMMERCIAL

## 2024-02-24 LAB
25(OH)D3 SERPL-MCNC: 33.3 NG/ML (ref 30–100)
ALBUMIN SERPL BCP-MCNC: 3.7 G/DL (ref 3.5–5)
ALP SERPL-CCNC: 53 U/L (ref 34–104)
ALT SERPL W P-5'-P-CCNC: 22 U/L (ref 7–52)
ANION GAP SERPL CALCULATED.3IONS-SCNC: 8 MMOL/L
AST SERPL W P-5'-P-CCNC: 19 U/L (ref 13–39)
BASOPHILS # BLD AUTO: 0.08 THOUSANDS/ÂΜL (ref 0–0.1)
BASOPHILS NFR BLD AUTO: 1 % (ref 0–1)
BILIRUB SERPL-MCNC: 0.62 MG/DL (ref 0.2–1)
BUN SERPL-MCNC: 18 MG/DL (ref 5–25)
CALCIUM SERPL-MCNC: 8.9 MG/DL (ref 8.4–10.2)
CHLORIDE SERPL-SCNC: 105 MMOL/L (ref 96–108)
CHOLEST SERPL-MCNC: 202 MG/DL
CO2 SERPL-SCNC: 26 MMOL/L (ref 21–32)
CREAT SERPL-MCNC: 0.84 MG/DL (ref 0.6–1.3)
CREAT UR-MCNC: 169.8 MG/DL
EOSINOPHIL # BLD AUTO: 0.77 THOUSAND/ÂΜL (ref 0–0.61)
EOSINOPHIL NFR BLD AUTO: 9 % (ref 0–6)
ERYTHROCYTE [DISTWIDTH] IN BLOOD BY AUTOMATED COUNT: 13.5 % (ref 11.6–15.1)
EST. AVERAGE GLUCOSE BLD GHB EST-MCNC: 140 MG/DL
GFR SERPL CREATININE-BSD FRML MDRD: 97 ML/MIN/1.73SQ M
GLUCOSE P FAST SERPL-MCNC: 111 MG/DL (ref 65–99)
HBA1C MFR BLD: 6.5 %
HCT VFR BLD AUTO: 45.5 % (ref 36.5–49.3)
HDLC SERPL-MCNC: 52 MG/DL
HGB BLD-MCNC: 15 G/DL (ref 12–17)
IMM GRANULOCYTES # BLD AUTO: 0.02 THOUSAND/UL (ref 0–0.2)
IMM GRANULOCYTES NFR BLD AUTO: 0 % (ref 0–2)
LDLC SERPL CALC-MCNC: 114 MG/DL (ref 0–100)
LYMPHOCYTES # BLD AUTO: 2.26 THOUSANDS/ÂΜL (ref 0.6–4.47)
LYMPHOCYTES NFR BLD AUTO: 25 % (ref 14–44)
MCH RBC QN AUTO: 30.7 PG (ref 26.8–34.3)
MCHC RBC AUTO-ENTMCNC: 33 G/DL (ref 31.4–37.4)
MCV RBC AUTO: 93 FL (ref 82–98)
MICROALBUMIN UR-MCNC: 7.6 MG/L
MICROALBUMIN/CREAT 24H UR: 4 MG/G CREATININE (ref 0–30)
MONOCYTES # BLD AUTO: 0.85 THOUSAND/ÂΜL (ref 0.17–1.22)
MONOCYTES NFR BLD AUTO: 9 % (ref 4–12)
NEUTROPHILS # BLD AUTO: 5.13 THOUSANDS/ÂΜL (ref 1.85–7.62)
NEUTS SEG NFR BLD AUTO: 56 % (ref 43–75)
NRBC BLD AUTO-RTO: 0 /100 WBCS
PLATELET # BLD AUTO: 273 THOUSANDS/UL (ref 149–390)
PMV BLD AUTO: 9.5 FL (ref 8.9–12.7)
POTASSIUM SERPL-SCNC: 4.3 MMOL/L (ref 3.5–5.3)
PROT SERPL-MCNC: 6.6 G/DL (ref 6.4–8.4)
PSA SERPL-MCNC: 0.43 NG/ML (ref 0–4)
RBC # BLD AUTO: 4.89 MILLION/UL (ref 3.88–5.62)
SODIUM SERPL-SCNC: 139 MMOL/L (ref 135–147)
TRIGL SERPL-MCNC: 181 MG/DL
TSH SERPL DL<=0.05 MIU/L-ACNC: 1.25 UIU/ML (ref 0.45–4.5)
WBC # BLD AUTO: 9.11 THOUSAND/UL (ref 4.31–10.16)

## 2024-02-24 PROCEDURE — G0103 PSA SCREENING: HCPCS | Performed by: FAMILY MEDICINE

## 2024-02-24 PROCEDURE — 83036 HEMOGLOBIN GLYCOSYLATED A1C: CPT | Performed by: FAMILY MEDICINE

## 2024-02-24 PROCEDURE — 80053 COMPREHEN METABOLIC PANEL: CPT | Performed by: FAMILY MEDICINE

## 2024-02-24 PROCEDURE — 82306 VITAMIN D 25 HYDROXY: CPT | Performed by: FAMILY MEDICINE

## 2024-02-24 PROCEDURE — 82043 UR ALBUMIN QUANTITATIVE: CPT | Performed by: FAMILY MEDICINE

## 2024-02-24 PROCEDURE — 36415 COLL VENOUS BLD VENIPUNCTURE: CPT | Performed by: FAMILY MEDICINE

## 2024-02-24 PROCEDURE — 80061 LIPID PANEL: CPT | Performed by: FAMILY MEDICINE

## 2024-02-24 PROCEDURE — 85025 COMPLETE CBC W/AUTO DIFF WBC: CPT | Performed by: FAMILY MEDICINE

## 2024-02-24 PROCEDURE — 84443 ASSAY THYROID STIM HORMONE: CPT | Performed by: FAMILY MEDICINE

## 2024-02-24 PROCEDURE — 82570 ASSAY OF URINE CREATININE: CPT | Performed by: FAMILY MEDICINE

## 2024-02-26 DIAGNOSIS — R73.9 HYPERGLYCEMIA: Primary | ICD-10-CM

## 2024-04-04 DIAGNOSIS — E78.2 MIXED HYPERLIPIDEMIA: ICD-10-CM

## 2024-04-04 RX ORDER — SIMVASTATIN 20 MG
20 TABLET ORAL
Qty: 90 TABLET | Refills: 3 | Status: SHIPPED | OUTPATIENT
Start: 2024-04-04

## 2024-04-09 ENCOUNTER — OFFICE VISIT (OUTPATIENT)
Dept: PODIATRY | Facility: CLINIC | Age: 59
End: 2024-04-09
Payer: COMMERCIAL

## 2024-04-09 VITALS
WEIGHT: 254 LBS | DIASTOLIC BLOOD PRESSURE: 87 MMHG | SYSTOLIC BLOOD PRESSURE: 128 MMHG | HEIGHT: 70 IN | BODY MASS INDEX: 36.36 KG/M2

## 2024-04-09 DIAGNOSIS — I73.9 PVD (PERIPHERAL VASCULAR DISEASE) (HCC): Primary | ICD-10-CM

## 2024-04-09 DIAGNOSIS — B35.1 ONYCHOMYCOSIS: ICD-10-CM

## 2024-04-09 PROCEDURE — 11721 DEBRIDE NAIL 6 OR MORE: CPT | Performed by: STUDENT IN AN ORGANIZED HEALTH CARE EDUCATION/TRAINING PROGRAM

## 2024-04-09 NOTE — PROGRESS NOTES
Brown Britt  1965  AT RISK FOOT CARE    1. PVD (peripheral vascular disease) ([facility])        2. Onychomycosis              Patient presents for at-risk foot care.  Patient denies acute concern.  Patient has significant lower extremity risk due to diminished pulses in the feet and trophic skin changes to the lower extremity including thick toenail, atrophic skin, and decreased hair growth.    On exam patient has thickened, hypertrophic, discolored, brittle toenails with subungual debris and tenderness x10  Patient has diminished pedal pulses and decreased perfusion to the lower extremities  Patient has significant trophic changes to the skin including thick toenails, decreased pedal hair and atrophic skin.     Today's treatment includes:  Debridement of toenails x10. Using nail nipper, jesse, and curette, nails were sharply debrided, reduced in thickness and length. Devitalized nail tissue and fungal debris excised and removed. Patient tolerated well.        Discussed proper shoe gear, daily inspections of feet, and general foot health with patient. Patient has Q8  findings and is recommended for at risk foot care every 9-10 weeks.    Patients most recent complete clinical foot exam was on: 6/27/23

## 2024-05-07 ENCOUNTER — OFFICE VISIT (OUTPATIENT)
Dept: FAMILY MEDICINE CLINIC | Facility: CLINIC | Age: 59
End: 2024-05-07
Payer: COMMERCIAL

## 2024-05-07 VITALS
OXYGEN SATURATION: 97 % | HEIGHT: 70 IN | SYSTOLIC BLOOD PRESSURE: 132 MMHG | BODY MASS INDEX: 36.36 KG/M2 | TEMPERATURE: 97.3 F | WEIGHT: 254 LBS | DIASTOLIC BLOOD PRESSURE: 98 MMHG | HEART RATE: 91 BPM

## 2024-05-07 DIAGNOSIS — G89.29 CHRONIC LEFT HIP PAIN: ICD-10-CM

## 2024-05-07 DIAGNOSIS — I10 ESSENTIAL HYPERTENSION: Primary | ICD-10-CM

## 2024-05-07 DIAGNOSIS — M25.552 CHRONIC LEFT HIP PAIN: ICD-10-CM

## 2024-05-07 DIAGNOSIS — E78.2 MIXED HYPERLIPIDEMIA: ICD-10-CM

## 2024-05-07 DIAGNOSIS — R73.9 HYPERGLYCEMIA: ICD-10-CM

## 2024-05-07 PROCEDURE — 99214 OFFICE O/P EST MOD 30 MIN: CPT | Performed by: FAMILY MEDICINE

## 2024-05-07 NOTE — PROGRESS NOTES
Assessment/Plan:    Problem List Items Addressed This Visit          Cardiovascular and Mediastinum    Essential hypertension - Primary       Other    Mixed hyperlipidemia    Hyperglycemia     Other Visit Diagnoses       Chronic left hip pain        Relevant Orders    XR hip/pelv 2-3 vws left if performed    MRI hip left wo contrast             Diagnoses and all orders for this visit:    Essential hypertension    Hyperglycemia    Mixed hyperlipidemia    Chronic left hip pain  -     XR hip/pelv 2-3 vws left if performed; Future  -     MRI hip left wo contrast; Future        No problem-specific Assessment & Plan notes found for this encounter.        Subjective:      Patient ID: Brown Britt is a 58 y.o. male.    Brown is here feeling well with the exception his left hip and his left inguinal groin area have pain most likely from a hip injury that occurred a year or 2 ago he slipped and fell hyperextending the hip and ever since then has had pain in the hip x-ray is negative but patient was negative but patient still has pain which makes me suspicious for a labral tear        The following portions of the patient's history were reviewed and updated as appropriate:   He has a past medical history of Hypercholesterolemia and Vitamin deficiency.,  does not have any pertinent problems on file.,   has a past surgical history that includes Cataract extraction, bilateral and Bariatric Surgery (03/01/2021).,  family history includes Heart Valve Disease in his mother.,   reports that he has never smoked. He has never been exposed to tobacco smoke. He has never used smokeless tobacco. He reports current alcohol use. He reports that he does not use drugs.,  is allergic to bee venom and penicillins..  Current Outpatient Medications   Medication Sig Dispense Refill    celecoxib (CeleBREX) 200 mg capsule Take 1 capsule (200 mg total) by mouth 2 (two) times a day as needed for moderate pain 180 capsule 1    losartan (COZAAR) 25 mg  "tablet Take 1 tablet (25 mg total) by mouth daily 90 tablet 3    simvastatin (ZOCOR) 20 mg tablet Take 1 tablet (20 mg total) by mouth daily at bedtime 90 tablet 3    Vitamin D, Ergocalciferol, 28873 units CAPS Take 1 capsule by mouth once a week 12 capsule 3     No current facility-administered medications for this visit.       Review of Systems   Constitutional:  Negative for activity change, appetite change, diaphoresis, fatigue and fever.   HENT: Negative.     Eyes: Negative.    Respiratory:  Negative for apnea, cough, chest tightness, shortness of breath and wheezing.    Cardiovascular:  Negative for chest pain, palpitations and leg swelling.   Gastrointestinal:  Negative for abdominal distention, abdominal pain, anal bleeding, constipation, diarrhea, nausea and vomiting.   Endocrine: Negative for cold intolerance, heat intolerance, polydipsia, polyphagia and polyuria.   Genitourinary:  Negative for difficulty urinating, dysuria, flank pain, hematuria and urgency.   Musculoskeletal:  Negative for arthralgias, back pain, gait problem, joint swelling and myalgias.        Left groin pain   Skin:  Negative for color change, rash and wound.   Allergic/Immunologic: Negative for environmental allergies, food allergies and immunocompromised state.   Neurological:  Negative for dizziness, seizures, syncope, speech difficulty, numbness and headaches.   Hematological:  Negative for adenopathy. Does not bruise/bleed easily.   Psychiatric/Behavioral:  Negative for agitation, behavioral problems, hallucinations, sleep disturbance and suicidal ideas.          Objective:  Vitals:    05/07/24 1715   BP: 132/98   BP Location: Left arm   Patient Position: Sitting   Cuff Size: Large   Pulse: 91   Temp: (!) 97.3 °F (36.3 °C)   TempSrc: Temporal   SpO2: 97%   Weight: 115 kg (254 lb)   Height: 5' 10\" (1.778 m)     Body mass index is 36.45 kg/m².     Physical Exam  Constitutional:       General: He is not in acute distress.     " Appearance: He is well-developed. He is not diaphoretic.   HENT:      Head: Normocephalic.      Right Ear: External ear normal.      Left Ear: External ear normal.      Nose: Nose normal.   Eyes:      General: No scleral icterus.        Right eye: No discharge.         Left eye: No discharge.      Conjunctiva/sclera: Conjunctivae normal.      Pupils: Pupils are equal, round, and reactive to light.   Neck:      Thyroid: No thyromegaly.      Trachea: No tracheal deviation.   Cardiovascular:      Rate and Rhythm: Normal rate and regular rhythm.      Heart sounds: Normal heart sounds. No murmur heard.     No friction rub. No gallop.   Pulmonary:      Effort: Pulmonary effort is normal. No respiratory distress.      Breath sounds: Normal breath sounds. No wheezing.   Abdominal:      General: Bowel sounds are normal.      Palpations: Abdomen is soft. There is no mass.      Tenderness: There is no abdominal tenderness. There is no guarding.   Musculoskeletal:         General: No deformity.      Cervical back: Normal range of motion.   Lymphadenopathy:      Cervical: No cervical adenopathy.   Skin:     General: Skin is warm and dry.      Findings: No erythema or rash.   Neurological:      Mental Status: He is alert and oriented to person, place, and time.      Cranial Nerves: No cranial nerve deficit.   Psychiatric:         Thought Content: Thought content normal.

## 2024-05-21 ENCOUNTER — TELEPHONE (OUTPATIENT)
Dept: FAMILY MEDICINE CLINIC | Facility: CLINIC | Age: 59
End: 2024-05-21

## 2024-05-21 NOTE — TELEPHONE ENCOUNTER
"Ins denying PRIOR AUTH on CT HIP - stating\" To approve, we need results of joint x-ray findings. We also need notes that say you did four weeks of stretches (such as physical therapy, chiropractic treatments, or medically directed home exercise program) in the last six months. We also need to know the number of visits you went to. We need to know the dates you did these exercises and that you did not get better and doctor's notes that say why you cannot do a different test (MRI (Magnetic Resonance Imaging - pictures of inside your body)). That test does not use x-rays. It is our medical view that the Hip CT (left) be denied as it is not medically necessary. [employer] Clinical Guideline 057-2 for Lower Extremity CT was used in this request.     Pls advise  "

## 2024-05-23 DIAGNOSIS — M25.559 HIP PAIN, UNSPECIFIED LATERALITY: Primary | ICD-10-CM

## 2024-05-24 ENCOUNTER — HOSPITAL ENCOUNTER (OUTPATIENT)
Dept: RADIOLOGY | Facility: HOSPITAL | Age: 59
Discharge: HOME | End: 2024-05-24
Payer: COMMERCIAL

## 2024-05-24 DIAGNOSIS — M25.559 HIP PAIN, UNSPECIFIED LATERALITY: ICD-10-CM

## 2024-05-24 PROCEDURE — 73522 X-RAY EXAM HIPS BI 3-4 VIEWS: CPT

## 2024-05-24 NOTE — TELEPHONE ENCOUNTER
Patient returned call, I informed him of the Xray order placed. He states he is going to get that done today within the hour. I informed him clinical staff will give him a call as soon as Dr. Prajapati has had a chance to see. Patient expressed understanding.

## 2024-05-24 NOTE — RESULT ENCOUNTER NOTE
Please call the patient regarding his abnormal result.  Severe degenerative hip disease left hip and right

## 2024-06-20 DIAGNOSIS — M19.90 UNSPECIFIED OSTEOARTHRITIS, UNSPECIFIED SITE: ICD-10-CM

## 2024-06-20 RX ORDER — CELECOXIB 200 MG/1
CAPSULE ORAL
Qty: 180 CAPSULE | Refills: 1 | Status: SHIPPED | OUTPATIENT
Start: 2024-06-20

## 2024-08-01 ENCOUNTER — OFFICE VISIT (OUTPATIENT)
Dept: PODIATRY | Facility: CLINIC | Age: 59
End: 2024-08-01
Payer: COMMERCIAL

## 2024-08-01 VITALS
RESPIRATION RATE: 16 BRPM | HEIGHT: 70 IN | SYSTOLIC BLOOD PRESSURE: 131 MMHG | WEIGHT: 262.2 LBS | DIASTOLIC BLOOD PRESSURE: 89 MMHG | BODY MASS INDEX: 37.54 KG/M2 | OXYGEN SATURATION: 99 % | HEART RATE: 87 BPM | TEMPERATURE: 98 F

## 2024-08-01 DIAGNOSIS — I73.9 PVD (PERIPHERAL VASCULAR DISEASE) (HCC): Primary | ICD-10-CM

## 2024-08-01 DIAGNOSIS — B35.1 ONYCHOMYCOSIS: ICD-10-CM

## 2024-08-01 PROCEDURE — 11721 DEBRIDE NAIL 6 OR MORE: CPT | Performed by: STUDENT IN AN ORGANIZED HEALTH CARE EDUCATION/TRAINING PROGRAM

## 2024-08-01 PROCEDURE — RECHECK: Performed by: STUDENT IN AN ORGANIZED HEALTH CARE EDUCATION/TRAINING PROGRAM

## 2024-08-01 NOTE — PROGRESS NOTES
Brown Britt  1965  AT RISK FOOT CARE    1. PVD (peripheral vascular disease) ([facility])        2. Onychomycosis                Patient presents for at-risk foot care.  Patient denies acute concern.  Patient has significant lower extremity risk due to diminished pulses in the feet and trophic skin changes to the lower extremity including thick toenail, atrophic skin, and decreased hair growth.    On exam patient has thickened, hypertrophic, discolored, brittle toenails with subungual debris and tenderness x10  Patient has diminished pedal pulses and decreased perfusion to the lower extremities  Patient has significant trophic changes to the skin including thick toenails, decreased pedal hair and atrophic skin.     Today's treatment includes:  Debridement of toenails x10. Using nail nipper, jesse, and curette, nails were sharply debrided, reduced in thickness and length. Devitalized nail tissue and fungal debris excised and removed. Patient tolerated well.        Discussed proper shoe gear, daily inspections of feet, and general foot health with patient. Patient has Q8  findings and is recommended for at risk foot care every 9-10 weeks.    Patients most recent complete clinical foot exam was 8/1/24

## 2024-10-07 ENCOUNTER — TELEPHONE (OUTPATIENT)
Age: 59
End: 2024-10-07

## 2024-10-07 NOTE — TELEPHONE ENCOUNTER
Pt called in wanting to transfer to new provider due to PCP retiring soon.     Pt is transferring to: Timo Castano    Please remove current PCP from PCP field.  Thank you!

## 2024-10-11 ENCOUNTER — OFFICE VISIT (OUTPATIENT)
Dept: PODIATRY | Facility: CLINIC | Age: 59
End: 2024-10-11
Payer: COMMERCIAL

## 2024-10-11 VITALS
RESPIRATION RATE: 16 BRPM | HEIGHT: 70 IN | OXYGEN SATURATION: 98 % | SYSTOLIC BLOOD PRESSURE: 145 MMHG | DIASTOLIC BLOOD PRESSURE: 89 MMHG | BODY MASS INDEX: 37.51 KG/M2 | WEIGHT: 262 LBS | HEART RATE: 88 BPM | TEMPERATURE: 98 F

## 2024-10-11 DIAGNOSIS — I73.9 PVD (PERIPHERAL VASCULAR DISEASE) (HCC): Primary | ICD-10-CM

## 2024-10-11 DIAGNOSIS — B35.1 ONYCHOMYCOSIS: ICD-10-CM

## 2024-10-11 DIAGNOSIS — L60.0 INGROWN TOENAIL: ICD-10-CM

## 2024-10-11 PROCEDURE — 11721 DEBRIDE NAIL 6 OR MORE: CPT | Performed by: PODIATRIST

## 2024-10-11 PROCEDURE — RECHECK: Performed by: PODIATRIST

## 2024-10-11 NOTE — PROGRESS NOTES
Ambulatory Visit  Name: Brown Britt      : 1965      MRN: [id number]  Encounter Provider: Jeffrey Killian DPM  Encounter Date: 10/11/2024   Encounter department: [facility] [place]    Assessment & Plan  PVD (peripheral vascular disease) ([facility])         Onychomycosis       Debride mycotic nails and thin the nail plates x 6 with the use of a nail nipper manually and an electric Dremel bur was used to reduce the thickness of the nail beds and smoothed the distal aspect of the nails.   Patient was explained the two forms of treatment for mycotic nails.  The topical (Penlac, OTC antifungal) needs to be applied twice a day for then next 6 to 9 months if done once a day it will double the time.  The other form is oral medication (Lamisil/terbinafine) is 1 pill a day for 3 months then finished.  They were told that with any of the these treatments it will take care of the fungal it will not necessary change the  thickness, coloration, and shape.    Ingrown toenail       A formal timeout including patient identification, laterality and existing allergies using [facility] was conducted. I recommend a partial temporary nail avulsion and informed consent obtained.     After cleansing skin with alcohol sprayed etoh chloride on the right lateral border of the hallux.  I carefully did a partially avulsion of the offending nail border with a small straight nail nipper and flushed with sterile saline. I dressed the site with bacitracin ointment and a DSD to be left intact x 24 hours. The patient may then remove the dressing, shower, and redress with antibiotic ointment and a band-aid daily.    Briefly explained the permanent nail avulsion to the patient for the future if it is needed.    Return in about 10 weeks (around 2024).     History of Present Illness     Brown Britt is a 59 y.o. male who presents chief complaint of painful thick nails on both feet.  He has a secondary complaint of a painful  "right big toe on the inside margin.    History obtained from : patient  Review of Systems  Medical History Reviewed by provider this encounter:       Current Outpatient Medications on File Prior to Visit   Medication Sig Dispense Refill    celecoxib (CeleBREX) 200 mg capsule Take 1 capsule (200 mg total) by mouth 2 (two) times a day as needed for moderate pain 180 capsule 1    losartan (COZAAR) 25 mg tablet Take 1 tablet (25 mg total) by mouth daily 90 tablet 3    simvastatin (ZOCOR) 20 mg tablet Take 1 tablet (20 mg total) by mouth daily at bedtime 90 tablet 3    Vitamin D, Ergocalciferol, 25726 units CAPS Take 1 capsule by mouth once a week 12 capsule 3     No current facility-administered medications on file prior to visit.      Social History     Tobacco Use    Smoking status: Never     Passive exposure: Never    Smokeless tobacco: Never   Vaping Use    Vaping status: Never Used   Substance and Sexual Activity    Alcohol use: Yes     Comment: occasionally    Drug use: Never    Sexual activity: Yes     Partners: Female         Objective     Ht 5' 10\" (1.778 m)   Wt 119 kg (262 lb)   BMI 37.59 kg/m²     Physical Exam  Vascular status is 1/4 DP 0/4 PT negative digital hair normal distal cooling immediate capillary refill bilaterally.  Capillary refill is immediate and the edema is +1.    Derm nails are brittle elongated hypertrophic yellow-brown discoloration with subungual debris x 6.  There is an increased thickness and the nails are approximately 1 to 2 mm with the right hallux nail being the worst.  The lateral border of the right hallux is incurvated with erythema small amount of proud flesh and pain upon palpation there is blanching on the distal aspect of the lateral border.  Negative signs of any infection    Ortho no pathology is noted at this time    Neuro is intact and equal bilaterally  Administrative Statements   I have spent a total time of 15 minutes in caring for this patient on the day of the " visit/encounter including Risks and benefits of tx options, Instructions for management, Patient and family education, Importance of tx compliance, Counseling / Coordination of care, Documenting in the medical record, Reviewing / ordering tests, medicine, procedures  , and Obtaining or reviewing history  .

## 2024-10-25 NOTE — TELEPHONE ENCOUNTER
10/25/24 11:20 AM    Hello.  The new [facility] PCP will be added to the patient's chart when they arrive for their first appointment with the office.     Thank you.  Mehnaz Rios

## 2024-11-06 DIAGNOSIS — Z90.3 S/P GASTRIC SLEEVE PROCEDURE: ICD-10-CM

## 2024-11-06 DIAGNOSIS — R80.9 MICROALBUMINURIA: ICD-10-CM

## 2024-11-06 RX ORDER — LOSARTAN POTASSIUM 25 MG/1
25 TABLET ORAL DAILY
Qty: 90 TABLET | Refills: 0 | Status: SHIPPED | OUTPATIENT
Start: 2024-11-06

## 2024-11-06 RX ORDER — ERGOCALCIFEROL 1.25 MG/1
1 CAPSULE ORAL WEEKLY
Qty: 12 CAPSULE | Refills: 0 | Status: SHIPPED | OUTPATIENT
Start: 2024-11-06

## 2024-11-07 ENCOUNTER — TELEPHONE (OUTPATIENT)
Age: 59
End: 2024-11-07
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
## 2024-11-08 ENCOUNTER — TELEPHONE (OUTPATIENT)
Age: 59
End: 2024-11-08
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
## 2024-11-08 NOTE — TELEPHONE ENCOUNTER
I spoke with the pt and gave him info as per Dr Prajapati.   He states it is to hard and he does not want to travel. He is going to try the office here in [place] and see when they can get him in.   He will call with any questions or concerns

## 2024-11-08 NOTE — TELEPHONE ENCOUNTER
Patient called for asap appointment; provided number for [facility] for sooner appointment.

## 2024-12-09 ENCOUNTER — OFFICE VISIT (OUTPATIENT)
Dept: FAMILY MEDICINE CLINIC | Facility: CLINIC | Age: 59
End: 2024-12-09
Payer: COMMERCIAL

## 2024-12-09 VITALS
TEMPERATURE: 97.8 F | HEART RATE: 86 BPM | BODY MASS INDEX: 37.59 KG/M2 | OXYGEN SATURATION: 96 % | DIASTOLIC BLOOD PRESSURE: 82 MMHG | SYSTOLIC BLOOD PRESSURE: 132 MMHG | HEIGHT: 70 IN | WEIGHT: 262.6 LBS

## 2024-12-09 DIAGNOSIS — R80.9 MICROALBUMINURIA: ICD-10-CM

## 2024-12-09 DIAGNOSIS — M16.0 OSTEOARTHRITIS OF BOTH HIPS, UNSPECIFIED OSTEOARTHRITIS TYPE: ICD-10-CM

## 2024-12-09 DIAGNOSIS — R73.9 HYPERGLYCEMIA: ICD-10-CM

## 2024-12-09 DIAGNOSIS — S02.2XXS OPEN FRACTURE OF NASAL BONE, SEQUELA: ICD-10-CM

## 2024-12-09 DIAGNOSIS — E55.9 VITAMIN D DEFICIENCY: ICD-10-CM

## 2024-12-09 DIAGNOSIS — F51.01 PRIMARY INSOMNIA: ICD-10-CM

## 2024-12-09 DIAGNOSIS — Z12.11 SCREENING FOR COLON CANCER: ICD-10-CM

## 2024-12-09 DIAGNOSIS — I10 ESSENTIAL HYPERTENSION: Primary | ICD-10-CM

## 2024-12-09 DIAGNOSIS — Z12.5 SCREENING FOR PROSTATE CANCER: ICD-10-CM

## 2024-12-09 DIAGNOSIS — E78.2 MIXED HYPERLIPIDEMIA: ICD-10-CM

## 2024-12-09 DIAGNOSIS — Z90.3 S/P GASTRIC SLEEVE PROCEDURE: ICD-10-CM

## 2024-12-09 DIAGNOSIS — M19.90 UNSPECIFIED OSTEOARTHRITIS, UNSPECIFIED SITE: ICD-10-CM

## 2024-12-09 PROBLEM — S02.2XXB OPEN FRACTURE OF NASAL BONES: Status: ACTIVE | Noted: 2024-12-09

## 2024-12-09 PROCEDURE — 99214 OFFICE O/P EST MOD 30 MIN: CPT | Performed by: FAMILY MEDICINE

## 2024-12-09 RX ORDER — SIMVASTATIN 20 MG
20 TABLET ORAL
Qty: 90 TABLET | Refills: 3 | Status: SHIPPED | OUTPATIENT
Start: 2024-12-09

## 2024-12-09 RX ORDER — ERGOCALCIFEROL 1.25 MG/1
1 CAPSULE ORAL WEEKLY
Qty: 12 CAPSULE | Refills: 0 | Status: SHIPPED | OUTPATIENT
Start: 2024-12-09

## 2024-12-09 RX ORDER — LOSARTAN POTASSIUM 25 MG/1
25 TABLET ORAL DAILY
Qty: 90 TABLET | Refills: 0 | Status: SHIPPED | OUTPATIENT
Start: 2024-12-09

## 2024-12-09 RX ORDER — TRAZODONE HYDROCHLORIDE 50 MG/1
50 TABLET, FILM COATED ORAL
Qty: 30 TABLET | Refills: 2 | Status: SHIPPED | OUTPATIENT
Start: 2024-12-09

## 2024-12-09 RX ORDER — CELECOXIB 200 MG/1
200 CAPSULE ORAL DAILY
Qty: 90 CAPSULE | Refills: 1 | Status: SHIPPED | OUTPATIENT
Start: 2024-12-09

## 2024-12-09 NOTE — ASSESSMENT & PLAN NOTE
Patient is currently tolerating simvastatin 20 mg daily.  Orders:    Lipid panel; Future    simvastatin (ZOCOR) 20 mg tablet; Take 1 tablet (20 mg total) by mouth daily at bedtime
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
## 2024-12-09 NOTE — ASSESSMENT & PLAN NOTE
Patient is currently tolerating losartan 25 5 mg daily.  Orders:    CBC and differential; Future    Comprehensive metabolic panel; Future    Hemoglobin A1C; Future    TSH, 3rd generation with Free T4 reflex; Future    Lipid panel; Future

## 2024-12-09 NOTE — ASSESSMENT & PLAN NOTE
Patient is on losartan to also help with the microalbuminuria.  Need to repeat urine microalbumin  Orders:    Albumin / creatinine urine ratio; Future    losartan (COZAAR) 25 mg tablet; Take 1 tablet (25 mg total) by mouth daily

## 2024-12-09 NOTE — ASSESSMENT & PLAN NOTE
Patient lost approximately 120 pounds after gastric surgery (sleeve) in about 2018.  Orders:    Vitamin D, Ergocalciferol, 38268 units CAPS; Take 1 capsule by mouth once a week

## 2024-12-09 NOTE — ASSESSMENT & PLAN NOTE
Past history of hyperglycemia.  Will check a hemoglobin A1c.  Orders:    Comprehensive metabolic panel; Future    Hemoglobin A1C; Future
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
## 2024-12-09 NOTE — ASSESSMENT & PLAN NOTE
Patient had an x-ray on 5/24/2024 which revealed moderate to severe degenerative change in the left hip with mild hypertrophic degenerative change of the right hip with preserved hip joint space.  Patient is on Celebrex 200 mg by mouth twice daily.  He is also on vitamin D 50,000 units weekly.  I advised the patient that lets decrease the Celebrex to 100 mg daily to protect kidney and GI function.  As long as the patient has no allergies to Tylenol arthritis, he can take Tylenol arthritis for breakthrough pain.
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
## 2024-12-20 ENCOUNTER — OFFICE VISIT (OUTPATIENT)
Dept: PODIATRY | Facility: CLINIC | Age: 59
End: 2024-12-20
Payer: COMMERCIAL

## 2024-12-20 VITALS
WEIGHT: 262.4 LBS | RESPIRATION RATE: 16 BRPM | BODY MASS INDEX: 37.56 KG/M2 | HEART RATE: 73 BPM | OXYGEN SATURATION: 98 % | HEIGHT: 70 IN | DIASTOLIC BLOOD PRESSURE: 92 MMHG | SYSTOLIC BLOOD PRESSURE: 144 MMHG | TEMPERATURE: 97.6 F

## 2024-12-20 DIAGNOSIS — M79.675 PAIN IN TOES OF BOTH FEET: ICD-10-CM

## 2024-12-20 DIAGNOSIS — I73.9 PVD (PERIPHERAL VASCULAR DISEASE) (HCC): ICD-10-CM

## 2024-12-20 DIAGNOSIS — B35.1 ONYCHOMYCOSIS: Primary | ICD-10-CM

## 2024-12-20 DIAGNOSIS — M79.674 PAIN IN TOES OF BOTH FEET: ICD-10-CM

## 2024-12-20 PROCEDURE — 11721 DEBRIDE NAIL 6 OR MORE: CPT | Performed by: PODIATRIST

## 2024-12-20 PROCEDURE — RECHECK: Performed by: PODIATRIST

## 2024-12-20 RX ORDER — TERBINAFINE HYDROCHLORIDE 250 MG/1
250 TABLET ORAL DAILY
Qty: 30 TABLET | Refills: 2 | Status: SHIPPED | OUTPATIENT
Start: 2024-12-20 | End: 2025-03-20

## 2024-12-20 NOTE — PROGRESS NOTES
Name: Brown Britt      : 1965      MRN: [id number]  Encounter Provider: Jeffrey Killian DPM  Encounter Date: 2024   Encounter department: [facility] [place]  :  Assessment & Plan  Onychomycosis  Patient was explained the two forms of treatment for mycotic nails.  The topical (Penlac, OTC antifungal) needs to be applied twice a day for then next 6 to 9 months if done once a day it will double the time.  The other form is oral medication (Lamisil/terbinafine) is 1 pill a day for 3 months then finished.  They were told that with any of the these treatments it will take care of the fungal it will not necessary change the  thickness, coloration, and shape.   Orders:    terbinafine (LamISIL) 250 mg tablet; Take 1 tablet (250 mg total) by mouth daily  Debride mycotic nails and thin the nail plates x 6 with the use of a nail nipper manually and an electric Dremel bur was used to reduce the thickness of the nail beds and smoothed the distal aspect of the nails.   As far as the penicillin allergy it was a rash that he developed so or going ahead with the Lamisil with him being aware that if he gets too problematic to call the office for medication.  PVD (peripheral vascular disease) (HCC)         Pain in toes of both feet         Discussed proper shoe gear, daily inspections of feet, and general foot health with patient. Patient has Q8  findings and is recommended for at risk foot care every 9-10 weeks.    Patients most recent complete clinical foot exam was on: 2024      Return in about 10 weeks (around 2025).     History of Present Illness   HPI  Brown Britt is a 59 y.o. male who presents chief complaint of painful thick nails on both feet.Patient presents for at-risk foot care.  Patient has no acute concerns today.  Patient has significant lower extremity risk due to neuropathy, parasthesia, edema, and trophic skin changes to the lower extremity.   History obtained from:  "patient    Review of Systems  Medical History Reviewed by provider this encounter:     .  Current Outpatient Medications on File Prior to Visit   Medication Sig Dispense Refill    celecoxib (CeleBREX) 200 mg capsule Take 1 capsule (200 mg total) by mouth daily 90 capsule 1    diphenhydrAMINE-acetaminophen (TYLENOL PM)  MG TABS Take 1 tablet by mouth daily at bedtime as needed for sleep      losartan (COZAAR) 25 mg tablet Take 1 tablet (25 mg total) by mouth daily 90 tablet 0    simvastatin (ZOCOR) 20 mg tablet Take 1 tablet (20 mg total) by mouth daily at bedtime 90 tablet 3    traZODone (DESYREL) 50 mg tablet Take 1 tablet (50 mg total) by mouth daily at bedtime 30 tablet 2    Vitamin D, Ergocalciferol, 97294 units CAPS Take 1 capsule by mouth once a week 12 capsule 0     No current facility-administered medications on file prior to visit.      Social History     Tobacco Use    Smoking status: Never     Passive exposure: Never    Smokeless tobacco: Never   Vaping Use    Vaping status: Never Used   Substance and Sexual Activity    Alcohol use: Yes     Comment: occasionally    Drug use: Never    Sexual activity: Yes     Partners: Female        Objective   /92 (BP Location: Left arm, Patient Position: Sitting, Cuff Size: Large)   Pulse 73   Temp 97.6 °F (36.4 °C) (Temporal)   Resp 16   Ht 5' 10\" (1.778 m)   Wt 119 kg (262 lb 6.4 oz)   SpO2 98%   BMI 37.65 kg/m²      Personally examined his last liver function test and all the values were fine for prescribing for the Lamisil tablets.      Physical Exam  Vascular status is a faint 1/4 DP 0/4 PT negative digital hair normal distal cooling immediate capillary refill bilaterally.  Capillary refill is approximately 2 to 3 seconds.    Derm nails are brittle elongated hypertrophic white-yellow discoloration with subungual debris x 6.  There is an increased thickness and the nails are approximately 1 to 2 mm.  Most of the mycosis is present on the distal " half of the nails.    Ortho and neuro exam are unchanged from his last visit on 10/11/2024.    Administrative Statements   I have spent a total time of 12 minutes in caring for this patient on the day of the visit/encounter including Risks and benefits of tx options, Instructions for management, Patient and family education, Importance of tx compliance, Risk factor reductions, Counseling / Coordination of care, and Documenting in the medical record.

## 2025-01-18 LAB — COLOGUARD RESULT REPORTABLE: NEGATIVE

## 2025-01-20 ENCOUNTER — RESULTS FOLLOW-UP (OUTPATIENT)
Dept: FAMILY MEDICINE CLINIC | Facility: CLINIC | Age: 60
End: 2025-01-20

## 2025-01-30 ENCOUNTER — OFFICE VISIT (OUTPATIENT)
Dept: FAMILY MEDICINE CLINIC | Facility: CLINIC | Age: 60
End: 2025-01-30
Payer: COMMERCIAL

## 2025-01-30 VITALS
OXYGEN SATURATION: 97 % | HEART RATE: 95 BPM | SYSTOLIC BLOOD PRESSURE: 120 MMHG | DIASTOLIC BLOOD PRESSURE: 82 MMHG | BODY MASS INDEX: 38.63 KG/M2 | WEIGHT: 269.2 LBS | TEMPERATURE: 97.6 F

## 2025-01-30 DIAGNOSIS — M16.0 OSTEOARTHRITIS OF BOTH HIPS, UNSPECIFIED OSTEOARTHRITIS TYPE: Primary | ICD-10-CM

## 2025-01-30 DIAGNOSIS — I10 ESSENTIAL HYPERTENSION: ICD-10-CM

## 2025-01-30 DIAGNOSIS — F51.01 PRIMARY INSOMNIA: ICD-10-CM

## 2025-01-30 PROCEDURE — 99213 OFFICE O/P EST LOW 20 MIN: CPT | Performed by: FAMILY MEDICINE

## 2025-01-30 NOTE — ASSESSMENT & PLAN NOTE
Will continue Celebrex once a day.  Will refer to orthopedics.  Orders:    Ambulatory Referral to Orthopedic Surgery; Future

## 2025-01-30 NOTE — PROGRESS NOTES
Name: Brown Britt      : 1965      MRN: [id number]  Encounter Provider: Timo Castano DO  Encounter Date: 2025   Encounter department: [facility]  :  Assessment & Plan  Osteoarthritis of both hips, unspecified osteoarthritis type  Will continue Celebrex once a day.  Will refer to orthopedics.  Orders:    Ambulatory Referral to Orthopedic Surgery; Future    Primary insomnia  Patient states Tylenol PM helps better than the trazodone.  Will discontinue trazodone.       Essential hypertension  Currently controlled.  Will continue current medication regimen.              History of Present Illness   Patient is a 59-year-old male who presents to the clinic for discussion of persistent left hip pain.  Patient does have a history of left hip degenerative joint disease.  He has seen orthopedics in the past.  There was discussion of the left total hip replacement.  The patient is currently on Celebrex.    Hip Pain       Review of Systems   Constitutional:  Negative for chills and fever.   HENT:  Negative for ear pain and sore throat.    Eyes:  Negative for pain and visual disturbance.   Respiratory:  Negative for cough and shortness of breath.    Cardiovascular:  Negative for chest pain and palpitations.   Gastrointestinal:  Negative for abdominal pain and vomiting.   Genitourinary:  Negative for dysuria and hematuria.   Musculoskeletal:  Positive for arthralgias (left hip pain > Right hip pain.). Negative for back pain.   Skin:  Negative for color change and rash.   Neurological:  Negative for seizures and syncope.   Psychiatric/Behavioral: Negative.     All other systems reviewed and are negative.      Objective   /82   Pulse 95   Temp 97.6 °F (36.4 °C)   Wt 122 kg (269 lb 3.2 oz)   SpO2 97%   BMI 38.63 kg/m²      Physical Exam  Vitals and nursing note reviewed.   Constitutional:       General: He is not in acute distress.     Appearance: Normal appearance. He is well-developed.    HENT:      Head: Normocephalic and atraumatic.   Eyes:      Conjunctiva/sclera: Conjunctivae normal.   Cardiovascular:      Rate and Rhythm: Normal rate and regular rhythm.      Pulses: Normal pulses.      Heart sounds: Normal heart sounds. No murmur heard.  Pulmonary:      Effort: Pulmonary effort is normal. No respiratory distress.      Breath sounds: Normal breath sounds.   Abdominal:      General: Bowel sounds are normal.      Palpations: Abdomen is soft.      Tenderness: There is no abdominal tenderness.   Musculoskeletal:         General: No swelling.      Cervical back: Neck supple.      Comments: Left hip with decreased range of motion on internal and external rotation.  Positive discomfort on internal rotation.   Skin:     General: Skin is warm and dry.      Capillary Refill: Capillary refill takes less than 2 seconds.   Neurological:      General: No focal deficit present.      Mental Status: He is alert and oriented to person, place, and time.   Psychiatric:         Mood and Affect: Mood normal.         Behavior: Behavior normal.

## 2025-02-05 ENCOUNTER — OFFICE VISIT (OUTPATIENT)
Dept: OBGYN CLINIC | Facility: CLINIC | Age: 60
End: 2025-02-05
Payer: COMMERCIAL

## 2025-02-05 VITALS — BODY MASS INDEX: 38.25 KG/M2 | HEIGHT: 70 IN | OXYGEN SATURATION: 98 % | HEART RATE: 87 BPM | WEIGHT: 267.2 LBS

## 2025-02-05 DIAGNOSIS — M16.0 PRIMARY OSTEOARTHRITIS OF BOTH HIPS: ICD-10-CM

## 2025-02-05 PROCEDURE — 99244 OFF/OP CNSLTJ NEW/EST MOD 40: CPT | Performed by: STUDENT IN AN ORGANIZED HEALTH CARE EDUCATION/TRAINING PROGRAM

## 2025-02-05 NOTE — ASSESSMENT & PLAN NOTE
59 male with right knee pain left greater than right due to osteoarthritis.  More severe on the left side both symptomatically and radiographically.  He has tried physical therapy, and anti-inflammatory medications today with minimal relief of symptoms.  Discussed role of corticosteroid injections as a temporary pain relief to decrease inflammation.  Also discussed the role of total hip arthroplasty.  Given that I do not perform this procedure and is interested in surgical management of his arthritis I recommend he follow-up with my colleague Dr. Rogers for evaluation for potential total hip replacement.  Referral was placed to see Dr. Rogers today.  Orders:    Ambulatory Referral to Orthopedic Surgery    Ambulatory Referral to Orthopedic Surgery; Future

## 2025-02-05 NOTE — PROGRESS NOTES
Assessment & Plan  Primary osteoarthritis of both hips  59 male with right knee pain left greater than right due to osteoarthritis.  More severe on the left side both symptomatically and radiographically.  He has tried physical therapy, and anti-inflammatory medications today with minimal relief of symptoms.  Discussed role of corticosteroid injections as a temporary pain relief to decrease inflammation.  Also discussed the role of total hip arthroplasty.  Given that I do not perform this procedure and is interested in surgical management of his arthritis I recommend he follow-up with my colleague Dr. Rogers for evaluation for potential total hip replacement.  Referral was placed to see Dr. Rogers today.  Orders:    Ambulatory Referral to Orthopedic Surgery    Ambulatory Referral to Orthopedic Surgery; Future        Chief Complaint   Patient presents with    Left Hip - Pain        Subjective    Brown Britt is a 59 y.o. male who presents with bilateral hip pain L>R:         History of Present Illness   Hip pain started many years ago >10.  The pain is 6/10. The pain is located in the groin. The pain is described as Sharp and Sore. They have tried PT, NSAIDS, and Activity Modification for their problem. Pain improves with rest. Pain worsens with walking and weight bearing.    The pain does not shoot down into the foot. The patient does not have numbness and/or tingling in the foot. The hip does  pop. The patient does not  have pain with coughing or sneezing. The patient does not  have bowel or bladder problems.    Ortho Sports Medicine Patient Answers  Failed to redirect to the Timeline version of the REVFS SmartLink.    Allergies   Allergen Reactions    Bee Venom Swelling     As child    Penicillins Rash     Outpatient Encounter Medications as of 2/5/2025   Medication Sig Dispense Refill    celecoxib (CeleBREX) 200 mg capsule Take 1 capsule (200 mg total) by mouth daily 90 capsule 1    diphenhydrAMINE-acetaminophen  (TYLENOL PM)  MG TABS Take 1 tablet by mouth daily at bedtime as needed for sleep      losartan (COZAAR) 25 mg tablet Take 1 tablet (25 mg total) by mouth daily 90 tablet 0    simvastatin (ZOCOR) 20 mg tablet Take 1 tablet (20 mg total) by mouth daily at bedtime 90 tablet 3    terbinafine (LamISIL) 250 mg tablet Take 1 tablet (250 mg total) by mouth daily 30 tablet 2    Vitamin D, Ergocalciferol, 78417 units CAPS Take 1 capsule by mouth once a week 12 capsule 0     No facility-administered encounter medications on file as of 2/5/2025.     Past Medical History:   Diagnosis Date    Hypercholesterolemia     Vitamin deficiency        Past Surgical History:   Procedure Laterality Date    BARIATRIC SURGERY  03/01/2021    CATARACT EXTRACTION, BILATERAL       Family History   Problem Relation Age of Onset    Heart Valve Disease Mother        Social History     Tobacco Use    Smoking status: Never     Passive exposure: Never    Smokeless tobacco: Never   Vaping Use    Vaping status: Never Used   Substance Use Topics    Alcohol use: Yes     Comment: occasionally    Drug use: Never           Objective    [unfilled]  [unfilled]  Body mass index is 38.34 kg/m².  Physical Exam  Hip/Spine Exam  Side: left   Gait: Antalgic   Tenderness: None       Hip range of motion   Flexion Extension IR ER   Left 90 0 0 10   Right 100 5 5 15   Hip strength   Flexion Extension Abduction Adduction   Left 5/5 5/5 5/5 5/5   Right 5/5 5/5 5/5 5/5   Provocative Tests:  Flexion/Internal rotation (FADIR) test for impingement: Positive   Scour test Positive   AWAIS test: Positive   Stinchfield test Positive   Log roll test Negative   Bobbi's test Deferred   Straight leg raise: Negative   Distally the patient's neurovascular status is normal.    IMAGING:  AP pelvis and bilateral hip x-rays obtained in May 2024 were independently reviewed and demonstrate severe left hip osteoarthritis with joint space obliteration osteophyte formation and mild to  moderate right hip osteoarthritis.        Follow Up: Return for With Dr. Rogers..    All questions answered and patient agrees with plan.

## 2025-02-11 ENCOUNTER — APPOINTMENT (OUTPATIENT)
Dept: RADIOLOGY | Facility: MEDICAL CENTER | Age: 60
End: 2025-02-11
Payer: COMMERCIAL

## 2025-02-11 ENCOUNTER — OFFICE VISIT (OUTPATIENT)
Dept: OBGYN CLINIC | Facility: CLINIC | Age: 60
End: 2025-02-11
Payer: COMMERCIAL

## 2025-02-11 VITALS
HEIGHT: 70 IN | WEIGHT: 267 LBS | BODY MASS INDEX: 38.22 KG/M2 | TEMPERATURE: 98.4 F | OXYGEN SATURATION: 97 % | HEART RATE: 100 BPM | RESPIRATION RATE: 16 BRPM

## 2025-02-11 DIAGNOSIS — M25.552 LEFT HIP PAIN: Primary | ICD-10-CM

## 2025-02-11 DIAGNOSIS — M16.12 PRIMARY OSTEOARTHRITIS OF ONE HIP, LEFT: ICD-10-CM

## 2025-02-11 DIAGNOSIS — M25.552 LEFT HIP PAIN: ICD-10-CM

## 2025-02-11 PROCEDURE — 73502 X-RAY EXAM HIP UNI 2-3 VIEWS: CPT

## 2025-02-11 PROCEDURE — 99215 OFFICE O/P EST HI 40 MIN: CPT | Performed by: STUDENT IN AN ORGANIZED HEALTH CARE EDUCATION/TRAINING PROGRAM

## 2025-02-11 RX ORDER — ASCORBIC ACID 500 MG
500 TABLET ORAL 2 TIMES DAILY
Qty: 60 TABLET | Refills: 1 | Status: SHIPPED | OUTPATIENT
Start: 2025-02-11

## 2025-02-11 RX ORDER — MUPIROCIN 20 MG/G
OINTMENT TOPICAL
Qty: 15 G | Refills: 1 | Status: SHIPPED | OUTPATIENT
Start: 2025-02-11

## 2025-02-11 RX ORDER — CHLORHEXIDINE GLUCONATE 40 MG/ML
SOLUTION TOPICAL DAILY PRN
OUTPATIENT
Start: 2025-02-11

## 2025-02-11 RX ORDER — ACETAMINOPHEN 325 MG/1
975 TABLET ORAL ONCE
OUTPATIENT
Start: 2025-02-11 | End: 2025-02-11

## 2025-02-11 RX ORDER — FOLIC ACID 1 MG/1
1 TABLET ORAL DAILY
Qty: 30 TABLET | Refills: 1 | Status: SHIPPED | OUTPATIENT
Start: 2025-02-11

## 2025-02-11 RX ORDER — SODIUM CHLORIDE, SODIUM LACTATE, POTASSIUM CHLORIDE, CALCIUM CHLORIDE 600; 310; 30; 20 MG/100ML; MG/100ML; MG/100ML; MG/100ML
125 INJECTION, SOLUTION INTRAVENOUS CONTINUOUS
OUTPATIENT
Start: 2025-02-11

## 2025-02-11 RX ORDER — CHLORHEXIDINE GLUCONATE ORAL RINSE 1.2 MG/ML
15 SOLUTION DENTAL ONCE
OUTPATIENT
Start: 2025-02-11 | End: 2025-02-11

## 2025-02-11 RX ORDER — FERROUS SULFATE 324(65)MG
324 TABLET, DELAYED RELEASE (ENTERIC COATED) ORAL
Qty: 30 TABLET | Refills: 1 | Status: SHIPPED | OUTPATIENT
Start: 2025-02-11 | End: 2025-04-12

## 2025-02-11 RX ORDER — MULTIVIT-MIN/IRON FUM/FOLIC AC 7.5 MG-4
1 TABLET ORAL DAILY
Qty: 30 TABLET | Refills: 1 | Status: SHIPPED | OUTPATIENT
Start: 2025-02-11

## 2025-02-11 NOTE — PROGRESS NOTES
Orthopedic Surgery Office Note  Brown Britt (59 y.o. male)   : 1965   MRN: [id number]   Encounter Date: 2025 with Dr. Jack Rogers,   Chief Complaint   Patient presents with    Left Hip - Pain     Discuss THR       Assessment, Plan, & Discussion:      Osteoarthritis of LEFT hip  -Discussed with patient that arthritis is a chronic, incurable, irreversible disease and that management would be focused on alleviating symptoms, as it is not possible to reverse or remove the degenerative changes. Discussed treatment options to include symptom masking with voltaren gel and OTC pain relievers, muscle strengthening with PT to have the joint rely on strong muscle rather than bad bone, and possible consideration of corticosteroid  injections in the future. Discussed definitive treatment as total joint replacement, which would be an elective, pain-relieving procedure, and that symptoms and radiographs may not align, leaving it to the patient to determine when the symptoms would warrant the surgery.   - PT script ordered  - No Injection planned at this time  - reviewed [facility] protocol, pt meets the criteria       Plan:  No follow-ups on file.        Surgery:   Elective total hip arthroplasty:   patient wishes to proceed with a LEFT total hip arthoplasty  patient understands that this is a same-day discharge procedure, and that outpatient PT will begin in the days following surgery  it has been discussed that their home environment and support system is prepared to assist in the day following surgery  If there are plans for discharge elsewhere (rehab, facility), joint nurse navigator referral given   Discharge to places other than home require extended hospital stay     The patient has failed non operative measures and has opted for surgical intervention. Risks and benefits of the treatment options and surgery were discussed in detail with the patient by Dr. Rogers. The risks of surgery including infection,  bleeding, injury to nerves, injury to the vessels, excess scar tissue formation, risk of failure of the procedure, the possible need for further surgery, and potential risk of loss of limb and life.  After weighing all the treatment options available, the patient has opted for surgical intervention and informed consent was obtained. We will schedule the patient to be seen back postoperatively.    Pre-op instructions  Medications:  Hold anticoagulation therapy 5-7 days prior to surgery date  Hold vitamins/minerals/supplements/ NSAIDs 7 days prior to surgery to decrease bleeding risk.  Hold diabetic medications, if applicable, morning of surgery.  Hold Ace/Arbs/CCB, if applicable, day of surgery  OK to take rest of your medications morning of surgery with small sip of water including pain medication.  NPO night before surgery at midnight  Advised to discuss this with their prescribers as well.         Orders:     Diagnoses and all orders for this visit:    Left hip pain  -     XR hip/pelv 2-3 vws left if performed; Future         History of Present Illness:     Brown Britt is a 59 y.o. male who presents for consultation at the request of Yakov Mccurdy,*  regarding left hip pain    He had trauma to the left hip 20 years ago, worsening in a cyclical fashion. He has tried and failed PT previously, with no relief, and continued pain worsening. Now he notes pain mostly at night, taking celebrex and tylenol to get a few hours of sleep. He notes difficulty with getting in and out of small cars. He localizes the pain with the c sign.     Review of Systems  Constitutional: Negative for fatigue, fever or loss of appetite.   HENT: Negative.    Respiratory: Negative for shortness of breath, dyspnea.    Cardiovascular: Negative for chest pain/tightness.   Gastrointestinal: Negative for abdominal pain, N/V.   Endocrine: Negative for cold/heat intolerance, unexplained weight loss/gain.   Genitourinary: Negative for flank  "pain, dysuria  Skin: Negative for rash.    Psychiatric/Behavioral: Negative for agitation.  All else negative unless otherwise noted in HPI    Physical Exam:   General:  Pulse 100   Temp 98.4 °F (36.9 °C)   Resp 16   Ht 5' 10\" (1.778 m)   Wt 121 kg (267 lb)   SpO2 97%   BMI 38.31 kg/m²   Cons: Appears well.  No apparent distress.  Psych: Alert. Oriented x3.  Mood and affect normal.  Eyes: PERRLA, EOMI  Resp: Normal effort.  No audible wheezing or stridor.  CV: Extremities warm and well perfused.   Abd:    No distention or guarding.   Skin: Warm. No visible lesions.  Neuro: Normal muscle tone.      Orthopedic Exam:   left Hip -  Patient presents with no anatomical deformity  Ambulates with antalgic gait pattern  Uses No assistive devices  ROM: seated IR seated ER limited due to pain. Intact but painful seated hip flexion  +stitchfield, able to SLR, no radicular symptoms   MMT: 5/5 throughout  Knee flexor and extensor mechanism intact pulses with brisk capillary refill to the toes  Sensation to light touch         Imaging/Studies:     Study: XR left hip  Date: 2/11/25  Report: no radiologist report. and I have personally reviewed the imaging in PACS and my impression is as follows:  I have personally reviewed imaging and my impression is as follows: significant joint space narrowing and subchondral sclerosis of left hip joint, compared to left. Severe left hip OA. Coxa magna.    Procedures  No procedures today.      Medical, Surgical, Family, and Social History    The patient's medical history, family history, and social history, were reviewed and updated as appropriate.    Past Medical History:   Diagnosis Date    Hypercholesterolemia     Vitamin deficiency      Past Surgical History:   Procedure Laterality Date    BARIATRIC SURGERY  03/01/2021    CATARACT EXTRACTION, BILATERAL       Family History   Problem Relation Age of Onset    Heart Valve Disease Mother      Social History     Occupational History    Not " on file   Tobacco Use    Smoking status: Never     Passive exposure: Never    Smokeless tobacco: Never   Vaping Use    Vaping status: Never Used   Substance and Sexual Activity    Alcohol use: Yes     Comment: occasionally    Drug use: Never    Sexual activity: Yes     Partners: Female     Allergies   Allergen Reactions    Bee Venom Swelling     As child    Penicillins Rash       Current Outpatient Medications:     celecoxib (CeleBREX) 200 mg capsule, Take 1 capsule (200 mg total) by mouth daily, Disp: 90 capsule, Rfl: 1    diphenhydrAMINE-acetaminophen (TYLENOL PM)  MG TABS, Take 1 tablet by mouth daily at bedtime as needed for sleep, Disp: , Rfl:     losartan (COZAAR) 25 mg tablet, Take 1 tablet (25 mg total) by mouth daily, Disp: 90 tablet, Rfl: 0    simvastatin (ZOCOR) 20 mg tablet, Take 1 tablet (20 mg total) by mouth daily at bedtime, Disp: 90 tablet, Rfl: 3    terbinafine (LamISIL) 250 mg tablet, Take 1 tablet (250 mg total) by mouth daily, Disp: 30 tablet, Rfl: 2    Vitamin D, Ergocalciferol, 66025 units CAPS, Take 1 capsule by mouth once a week, Disp: 12 capsule, Rfl: 0      This Visit:       Piero CARIAS PA-C, scribed this note in conjunction with and in the presence of Dr. Jack Rogers DO, who performed parts of the services as described in this documentation    Dr. Jack Rogers DO, Orthopedic Surgeon  [facility]

## 2025-02-20 ENCOUNTER — OFFICE VISIT (OUTPATIENT)
Dept: LAB | Facility: HOSPITAL | Age: 60
End: 2025-02-20
Payer: COMMERCIAL

## 2025-02-20 ENCOUNTER — APPOINTMENT (OUTPATIENT)
Dept: LAB | Facility: HOSPITAL | Age: 60
End: 2025-02-20
Payer: COMMERCIAL

## 2025-02-20 DIAGNOSIS — R80.9 MICROALBUMINURIA: ICD-10-CM

## 2025-02-20 DIAGNOSIS — M25.552 LEFT HIP PAIN: ICD-10-CM

## 2025-02-20 DIAGNOSIS — Z12.5 SCREENING FOR PROSTATE CANCER: ICD-10-CM

## 2025-02-20 DIAGNOSIS — M16.12 PRIMARY OSTEOARTHRITIS OF ONE HIP, LEFT: ICD-10-CM

## 2025-02-20 DIAGNOSIS — E78.2 MIXED HYPERLIPIDEMIA: ICD-10-CM

## 2025-02-20 DIAGNOSIS — I10 ESSENTIAL HYPERTENSION: ICD-10-CM

## 2025-02-20 DIAGNOSIS — Z01.818 OTHER SPECIFIED PRE-OPERATIVE EXAMINATION: ICD-10-CM

## 2025-02-20 DIAGNOSIS — R73.9 HYPERGLYCEMIA: ICD-10-CM

## 2025-02-20 LAB
ABO GROUP BLD: NORMAL
ALBUMIN SERPL BCG-MCNC: 4.2 G/DL (ref 3.5–5)
ALP SERPL-CCNC: 51 U/L (ref 34–104)
ALT SERPL W P-5'-P-CCNC: 19 U/L (ref 7–52)
ANION GAP SERPL CALCULATED.3IONS-SCNC: 9 MMOL/L (ref 4–13)
APTT PPP: 25 SECONDS (ref 23–34)
AST SERPL W P-5'-P-CCNC: 16 U/L (ref 13–39)
ATRIAL RATE: 74 BPM
BASOPHILS # BLD AUTO: 0.05 THOUSANDS/ΜL (ref 0–0.1)
BASOPHILS NFR BLD AUTO: 1 % (ref 0–1)
BILIRUB SERPL-MCNC: 0.7 MG/DL (ref 0.2–1)
BLD GP AB SCN SERPL QL: NEGATIVE
BUN SERPL-MCNC: 19 MG/DL (ref 5–25)
CALCIUM SERPL-MCNC: 8.8 MG/DL (ref 8.4–10.2)
CHLORIDE SERPL-SCNC: 103 MMOL/L (ref 96–108)
CHOLEST SERPL-MCNC: 215 MG/DL (ref ?–200)
CO2 SERPL-SCNC: 23 MMOL/L (ref 21–32)
CREAT SERPL-MCNC: 1.02 MG/DL (ref 0.6–1.3)
CREAT UR-MCNC: 104.3 MG/DL
EOSINOPHIL # BLD AUTO: 0.31 THOUSAND/ΜL (ref 0–0.61)
EOSINOPHIL NFR BLD AUTO: 4 % (ref 0–6)
ERYTHROCYTE [DISTWIDTH] IN BLOOD BY AUTOMATED COUNT: 13 % (ref 11.6–15.1)
EST. AVERAGE GLUCOSE BLD GHB EST-MCNC: 137 MG/DL
GFR SERPL CREATININE-BSD FRML MDRD: 80 ML/MIN/1.73SQ M
GLUCOSE P FAST SERPL-MCNC: 106 MG/DL (ref 65–99)
HBA1C MFR BLD: 6.4 %
HCT VFR BLD AUTO: 46.7 % (ref 36.5–49.3)
HDLC SERPL-MCNC: 47 MG/DL
HGB BLD-MCNC: 16.1 G/DL (ref 12–17)
IMM GRANULOCYTES # BLD AUTO: 0.02 THOUSAND/UL (ref 0–0.2)
IMM GRANULOCYTES NFR BLD AUTO: 0 % (ref 0–2)
INR PPP: 0.97 (ref 0.85–1.19)
LDLC SERPL CALC-MCNC: 134 MG/DL (ref 0–100)
LYMPHOCYTES # BLD AUTO: 2.04 THOUSANDS/ΜL (ref 0.6–4.47)
LYMPHOCYTES NFR BLD AUTO: 25 % (ref 14–44)
MCH RBC QN AUTO: 30.5 PG (ref 26.8–34.3)
MCHC RBC AUTO-ENTMCNC: 34.5 G/DL (ref 31.4–37.4)
MCV RBC AUTO: 88 FL (ref 82–98)
MICROALBUMIN UR-MCNC: <7 MG/L
MONOCYTES # BLD AUTO: 0.61 THOUSAND/ΜL (ref 0.17–1.22)
MONOCYTES NFR BLD AUTO: 8 % (ref 4–12)
NEUTROPHILS # BLD AUTO: 5.12 THOUSANDS/ΜL (ref 1.85–7.62)
NEUTS SEG NFR BLD AUTO: 62 % (ref 43–75)
NONHDLC SERPL-MCNC: 168 MG/DL
NRBC BLD AUTO-RTO: 0 /100 WBCS
P AXIS: 18 DEGREES
PLATELET # BLD AUTO: 219 THOUSANDS/UL (ref 149–390)
PMV BLD AUTO: 8.9 FL (ref 8.9–12.7)
POTASSIUM SERPL-SCNC: 4 MMOL/L (ref 3.5–5.3)
PR INTERVAL: 140 MS
PROT SERPL-MCNC: 7.3 G/DL (ref 6.4–8.4)
PROTHROMBIN TIME: 13.4 SECONDS (ref 12.3–15)
PSA SERPL-MCNC: 0.32 NG/ML (ref 0–4)
QRS AXIS: 11 DEGREES
QRSD INTERVAL: 88 MS
QT INTERVAL: 414 MS
QTC INTERVAL: 460 MS
RBC # BLD AUTO: 5.28 MILLION/UL (ref 3.88–5.62)
RH BLD: POSITIVE
SODIUM SERPL-SCNC: 135 MMOL/L (ref 135–147)
SPECIMEN EXPIRATION DATE: NORMAL
T WAVE AXIS: 12 DEGREES
TRIGL SERPL-MCNC: 170 MG/DL (ref ?–150)
TSH SERPL DL<=0.05 MIU/L-ACNC: 1.72 UIU/ML (ref 0.45–4.5)
VENTRICULAR RATE: 74 BPM
WBC # BLD AUTO: 8.15 THOUSAND/UL (ref 4.31–10.16)

## 2025-02-20 PROCEDURE — 82570 ASSAY OF URINE CREATININE: CPT

## 2025-02-20 PROCEDURE — 86901 BLOOD TYPING SEROLOGIC RH(D): CPT

## 2025-02-20 PROCEDURE — 85730 THROMBOPLASTIN TIME PARTIAL: CPT

## 2025-02-20 PROCEDURE — 85025 COMPLETE CBC W/AUTO DIFF WBC: CPT

## 2025-02-20 PROCEDURE — 85610 PROTHROMBIN TIME: CPT

## 2025-02-20 PROCEDURE — 84443 ASSAY THYROID STIM HORMONE: CPT

## 2025-02-20 PROCEDURE — 82043 UR ALBUMIN QUANTITATIVE: CPT

## 2025-02-20 PROCEDURE — 86850 RBC ANTIBODY SCREEN: CPT

## 2025-02-20 PROCEDURE — 36415 COLL VENOUS BLD VENIPUNCTURE: CPT

## 2025-02-20 PROCEDURE — 87081 CULTURE SCREEN ONLY: CPT

## 2025-02-20 PROCEDURE — 86900 BLOOD TYPING SEROLOGIC ABO: CPT

## 2025-02-20 PROCEDURE — 83036 HEMOGLOBIN GLYCOSYLATED A1C: CPT

## 2025-02-20 PROCEDURE — G0103 PSA SCREENING: HCPCS

## 2025-02-20 PROCEDURE — 80061 LIPID PANEL: CPT

## 2025-02-20 PROCEDURE — 80053 COMPREHEN METABOLIC PANEL: CPT

## 2025-02-20 PROCEDURE — 93005 ELECTROCARDIOGRAM TRACING: CPT

## 2025-02-21 ENCOUNTER — TELEPHONE (OUTPATIENT)
Age: 60
End: 2025-02-21

## 2025-02-21 ENCOUNTER — TELEPHONE (OUTPATIENT)
Dept: OBGYN CLINIC | Facility: HOSPITAL | Age: 60
End: 2025-02-21

## 2025-02-21 LAB
DME PARACHUTE DELIVERY DATE REQUESTED: NORMAL
DME PARACHUTE ITEM DESCRIPTION: NORMAL
DME PARACHUTE ORDER STATUS: NORMAL
DME PARACHUTE SUPPLIER NAME: NORMAL
DME PARACHUTE SUPPLIER PHONE: NORMAL
MRSA NOSE QL CULT: NORMAL

## 2025-02-21 NOTE — TELEPHONE ENCOUNTER
Preoperative Elective Admission Assessment- spoke with patient     EKG/LAB/MRSA SWAB/CXR date: patient got labs and EKG done yesterday     Living Situation:    Who does pt live with: mother   What kind of home: multi-level  How do they enter the home: front  How many levels in home: 2  # of steps to enter home: 1  # of steps to second floor: 13- has stair lift   Are there handrails: Yes  Are there landings: Yes  Sleeping arrangement: first/entry floor  Where is Bathroom: second floor   Where is the tub or shower: second floor- tub shower   Toilet height? Concerns for low toilets - low toilet   Dogs or ther pets:      First Floor Setup:   Is there a bathroom: No  Where would pt sleep: bed     DME: Ordering RW; STS, shower bench   We discussed clearing pathways in the home and making sure there is accessibly to use the walker, for example, removing throw rugs.      Patient's Current Level of Function: Ambulates: Independently    Post-op Caregiver: mother   Caregiver Name and phone number for Inpatient discharge needs: Jayna Robles (mother) [phone]  Currently receive any HHC/aides/community supports: No     Post-op Transport: mother   To/from hospital: mother  To/from PT 2-3x/week: mother or friends  Uses community transport now: No     Outpatient Physical Therapy Site:  Site: [place]   pre and post-op appts scheduled? Yes     Medication Management: self  Preferred Pharmacy for Post-op Medications: [facility] [address] [facility] [[id number]]   Blood Management Vitamin Regimen: Pt confirms taking as prescribed  Post-op anticoagulant: to be determined by surgical team postoperatively  Has Bactroban for 5 days preop: Yes  Educated on Preoperative Bathing Instructions, and use of Soap for 5 days before surgery.      DC Plan: Pt plans to be discharged home      Barriers to DC identified preoperatively: none identified    BMI: 38.31    Patient Education:  Pt educated on post-op pain, early  mobilization (POD0), LOS goals, OP PT goals, and preoperative bathing. Patient educated that our goal is to appropriately discharge patient based off their post-op function while striving to maintain maximal independence. The goal is to discharge patient to home and for them to attend outpatient physical therapy.    Assigned to care team? Yes

## 2025-02-21 NOTE — TELEPHONE ENCOUNTER
Calling this add on case to do a pre operative assessment and will answer these questions. Refer to preoperative assessment encounter. Thank you

## 2025-02-21 NOTE — TELEPHONE ENCOUNTER
Caller: Brown    Call back#: [phone]    Best call back time am/pm/all day: all day    Doctor: Dr Dutta    Surgery: yes    Date of Surgery: Upcoming 3/10    Reason for call: Just picked up his vitamins and wanted to know when he should start taking them?      Urgent matters - Please Teams message Nurse Navigator Team Chat & send phone note to Orthopedic Nurse Navigator Pool as high priority before transferring call.   Non-Urgent matters - Please send a phone note to Orthopedic Nurse Navigator Pool only. Nurse Navigators will call patients back asap.

## 2025-02-25 ENCOUNTER — EVALUATION (OUTPATIENT)
Dept: PHYSICAL THERAPY | Facility: CLINIC | Age: 60
End: 2025-02-25
Payer: COMMERCIAL

## 2025-02-25 DIAGNOSIS — M16.12 PRIMARY OSTEOARTHRITIS OF ONE HIP, LEFT: ICD-10-CM

## 2025-02-25 DIAGNOSIS — M25.552 LEFT HIP PAIN: ICD-10-CM

## 2025-02-25 LAB
DME PARACHUTE DELIVERY DATE ACTUAL: NORMAL
DME PARACHUTE DELIVERY DATE EXPECTED: NORMAL
DME PARACHUTE DELIVERY DATE REQUESTED: NORMAL
DME PARACHUTE ITEM DESCRIPTION: NORMAL
DME PARACHUTE ORDER STATUS: NORMAL
DME PARACHUTE SUPPLIER NAME: NORMAL
DME PARACHUTE SUPPLIER PHONE: NORMAL

## 2025-02-25 PROCEDURE — 97161 PT EVAL LOW COMPLEX 20 MIN: CPT | Performed by: PHYSICAL THERAPIST

## 2025-02-25 PROCEDURE — 97535 SELF CARE MNGMENT TRAINING: CPT | Performed by: PHYSICAL THERAPIST

## 2025-02-25 PROCEDURE — 97110 THERAPEUTIC EXERCISES: CPT | Performed by: PHYSICAL THERAPIST

## 2025-02-25 NOTE — PROGRESS NOTES
PT Evaluation     Today's date: 2025  Patient name: Brown Britt  : 1965  MRN: [id number]  Referring provider: Jack Rogers DO  Dx:   Encounter Diagnosis     ICD-10-CM    1. Left hip pain  M25.552 Ambulatory referral to Physical Therapy      2. Primary osteoarthritis of one hip, left  M16.12 Ambulatory referral to Physical Therapy          Start Time: 1345  Stop Time: 1430  Total time in clinic (min): 45 minutes    Assessment  Impairments: abnormal or restricted ROM, impaired physical strength, lacks appropriate home exercise program and pain with function    Assessment details: The patient is a 58 yo male who presents to physical therapy with signs and symptoms consistent with degenerative changes in the L hip. He is scheduled for a L THR on 3-10-25 with Dr. Rogers. Deficits are noted in L hip mobility and strength. He would benefit from a course of skilled physical therapy to address deficits in ROM, strength, stability and functional status.     Risk Assessment and Prediction tool results reviewed. Discussed DOS and patient's questions were answered. Mobility/ROM and strength results in objective findings. Virtual Home Assessment was reviewed with patient. Home preparation checklist was reviewed with patient including identification of care partner and encouragement of single level set-up. Post-operative pain management expectations discussed. Post-operative gait training for level ground, stairs and car transfers was performed. Patient demonstrated competence with immediate post-operative home exercise program.      Understanding of Dx/Px/POC: good     Prognosis: good    Goals  STG(6 weeks):             1. Independent with HEP            2. Decrease pain to 4/10 at best with functional activities            3. Increase AROM L hip to WFL            4. Increase strength LLE by 1/2 MMT grade            5. Patient will ambulate household distances with least restrictive AD     LTG(12 weeks):              1. Decrease pain to 2/10 at worst with functional activities             2. Increase LLE strength to 4+ to 5/5             3.Patient will ambulate community distances with least restrictive AD             4. Return to PLOF       Plan  Patient would benefit from: skilled physical therapy  Planned modality interventions: cryotherapy    Planned therapy interventions: abdominal trunk stabilization, balance, manual therapy, neuromuscular re-education, patient/caregiver education, gait training, strengthening, stretching, therapeutic activities, therapeutic exercise and home exercise program    Frequency: 2-3x week  Duration in weeks: 12  Plan of Care beginning date: 2025  Plan of Care expiration date: 2025  Treatment plan discussed with: patient        Subjective Evaluation    History of Present Illness  Mechanism of injury: The patient reports a long history of L hip pain. He started to notice a limp and he was having a hard time getting comfortable to sleep. Xrays revealed significant OA. He is scheduled for a L THR on 3-10-25 by Dr. Rogers. He will be seen today for a pre-operative visit. The patient has a stair glide to his second floor and is installing a higher toilet this weekend. He has a RW at home.   Patient Goals  Patient goal: To have normal ROM, be able to get my socks on easier  Pain  Current pain ratin  At best pain ratin  At worst pain ratin  Location: Anterior L hip  Quality: dull ache, burning and sharp  Relieving factors: rest and medications  Aggravating factors: sitting and walking    Social Support  Steps to enter house: yes  Stairs in house: yes   Lives in: multiple-level home  Lives with: parents    Employment status: not working        Objective     Active Range of Motion   Left Hip   Flexion: 95 degrees   Abduction: 20 degrees     Strength/Myotome Testing     Left Hip   Planes of Motion   Flexion: 4  Extension: 4  Abduction: 4    Right Hip   Planes of Motion   Flexion:  "4+  Extension: 4+  Abduction: 4+    Tests     Left Hip   Positive AWAIS.     Additional Tests Details  Flexibility   Hamstring  L  Poor, R Fair                    Piriformis   L Poor, R Poor             Precautions: L THR 3-10-25  HEP issued         2/25            Manuals                                                    Neuro Re-Ed                                                                                                        Ther Ex             SKTC 10\"x10 w/ towel            Piri stretch 10\"x10 w/ towel            SLR 10x            Iso hip add 5\"x10 in HL            HS stretch 30\"x3 on 8\" step                                                   Ther Activity                                       Gait Training                                       Modalities                                            "

## 2025-02-26 ENCOUNTER — CONSULT (OUTPATIENT)
Dept: FAMILY MEDICINE CLINIC | Facility: CLINIC | Age: 60
End: 2025-02-26
Payer: COMMERCIAL

## 2025-02-26 VITALS
OXYGEN SATURATION: 96 % | SYSTOLIC BLOOD PRESSURE: 134 MMHG | DIASTOLIC BLOOD PRESSURE: 86 MMHG | WEIGHT: 273.2 LBS | BODY MASS INDEX: 39.11 KG/M2 | TEMPERATURE: 98.8 F | HEART RATE: 82 BPM | HEIGHT: 70 IN

## 2025-02-26 DIAGNOSIS — M16.12 PRIMARY OSTEOARTHRITIS OF LEFT HIP: ICD-10-CM

## 2025-02-26 DIAGNOSIS — Z01.818 PREOPERATIVE CLEARANCE: Primary | ICD-10-CM

## 2025-02-26 PROCEDURE — 99243 OFF/OP CNSLTJ NEW/EST LOW 30: CPT | Performed by: FAMILY MEDICINE

## 2025-02-26 RX ORDER — TRAZODONE HYDROCHLORIDE 50 MG/1
50 TABLET ORAL
COMMUNITY
Start: 2025-01-30 | End: 2025-02-27 | Stop reason: ALTCHOICE

## 2025-02-26 RX ORDER — PNV,CALCIUM 72/IRON/FOLIC ACID 27 MG-1 MG
1 TABLET ORAL DAILY
COMMUNITY
Start: 2025-02-11

## 2025-02-26 NOTE — PROGRESS NOTES
Name: Brown Britt      : 1965      MRN: [id number]  Encounter Provider: Timo Castano DO  Encounter Date: 2025   Encounter department: [facility]  :  Assessment & Plan  Preoperative clearance  The patient is a pleasant 59-year-old male who presents today for preoperative clearance.  The patient is scheduled for a left hip total arthroplasty due to the fact that he has primary osteoarthritis of the left hip.  Anesthesia type will be of choice.  The patient will have the procedure done at [facility] by Dr. Jack Rogers on March 10, 2025 I reviewed the preoperative lab work.  The patient's hemoglobin A1c was 6.4.,  Complete blood count normal.  Complete metabolic panel revealed normal kidney and liver function.,  Thyroid function test normal.,  PSA normal.  MRSA culture negative, PT normal, INR normal, PTT normal, patient's blood type is a positive.  The patient's preoperative EKG revealed normal sinus rhythm and normal EKG.       Primary osteoarthritis of left hip  Scheduled for above procedure.         Patient is medically cleared to proceed with surgery.     History of Present Illness   The patient is a pleasant 59-year-old male who presents today for preoperative clearance.  The patient is scheduled for a left hip total arthroplasty due to the fact that he has primary osteoarthritis of the left hip.  Anesthesia type will be of choice.  The patient will have the procedure done at [facility] by Dr. Jack Rogers on March 10, 2025    I reviewed all preop testing.  Patient is medically cleared to proceed with surgery.         Pre-op Exam    Pre-operative Risk Factors:    History of cerebrovascular disease: No    History of ischemic heart disease: No  Pre-operative treatment with insulin: No  Pre-operative creatinine >2 mg/dL: No    History of congestive heart failure: No    Review of Systems   Constitutional:  Negative for chills and fever.   HENT:  Negative  "for ear pain and sore throat.    Eyes:  Negative for pain and visual disturbance.   Respiratory:  Negative for cough and shortness of breath.    Cardiovascular:  Negative for chest pain and palpitations.   Gastrointestinal:  Negative for abdominal pain and vomiting.   Genitourinary:  Negative for dysuria and hematuria.   Musculoskeletal:  Positive for arthralgias (Left hip pain). Negative for back pain.   Skin:  Negative for color change and rash.   Neurological:  Negative for seizures and syncope.   All other systems reviewed and are negative.      Objective   /86   Pulse 82   Temp 98.8 °F (37.1 °C)   Ht 5' 10\" (1.778 m)   Wt 124 kg (273 lb 3.2 oz)   SpO2 96%   BMI 39.20 kg/m²      Physical Exam  Vitals and nursing note reviewed.   Constitutional:       General: He is not in acute distress.     Appearance: Normal appearance. He is well-developed.   HENT:      Head: Normocephalic and atraumatic.      Right Ear: Tympanic membrane normal.      Left Ear: Tympanic membrane normal.      Mouth/Throat:      Mouth: Mucous membranes are moist.      Pharynx: Oropharynx is clear.   Eyes:      Extraocular Movements: Extraocular movements intact.      Conjunctiva/sclera: Conjunctivae normal.      Pupils: Pupils are equal, round, and reactive to light.   Cardiovascular:      Rate and Rhythm: Normal rate and regular rhythm.      Heart sounds: Normal heart sounds. No murmur heard.     No friction rub.   Pulmonary:      Effort: Pulmonary effort is normal. No respiratory distress.      Breath sounds: Normal breath sounds.   Abdominal:      General: Bowel sounds are normal.      Palpations: Abdomen is soft.      Tenderness: There is no abdominal tenderness. There is no guarding or rebound.      Hernia: No hernia is present.   Musculoskeletal:         General: No swelling.      Cervical back: Neck supple.   Skin:     General: Skin is warm and dry.      Capillary Refill: Capillary refill takes less than 2 seconds. "   Neurological:      General: No focal deficit present.      Mental Status: He is alert and oriented to person, place, and time.      Gait: Gait normal.   Psychiatric:         Mood and Affect: Mood normal.         Behavior: Behavior normal.         Thought Content: Thought content normal.     Is medically cleared to proceed with surgery.

## 2025-02-27 NOTE — PRE-PROCEDURE INSTRUCTIONS
Pre-Surgery Instructions:   Medication Instructions    ascorbic acid (VITAMIN C) 500 MG tablet Hold day of surgery.    celecoxib (CeleBREX) 200 mg capsule Stop taking 7 days prior to surgery.    diphenhydrAMINE-acetaminophen (TYLENOL PM)  MG TABS Take night before surgery    ferrous sulfate 324 (65 Fe) mg Hold day of surgery.    folic acid (FOLVITE) 1 mg tablet Hold day of surgery.    losartan (COZAAR) 25 mg tablet Hold day of surgery.    Multiple Vitamins-Minerals (multivitamin with minerals) tablet Hold day of surgery.    mupirocin (BACTROBAN) 2 % ointment Take day of surgery.    simvastatin (ZOCOR) 20 mg tablet Take day of surgery.    terbinafine (LamISIL) 250 mg tablet Hold day of surgery.    Vitamin D, Ergocalciferol, 92962 units CAPS Hold day of surgery.    Ortho class completed with pt. All questions answered .Reviewed deep breathing techniques , pain scale, signs and symptoms of infection, handwashinng. Instructed on bathing 5 days including day of surgery with chg soap , and muprocin ointment to nares. Instructed to bring dme day of surgery and to wear stable shoes .       Medication instructions for day of surgery reviewed. Please take all instructed medications with only a sip of water.       You will receive a call one business day prior to surgery with an arrival time and hospital directions. If your surgery is scheduled on a Monday, the hospital will be calling you on the Friday prior to your surgery. If you have not heard from anyone by 8pm, please call the hospital supervisor through the hospital  at [phone]. ([place] [phone] or [place] [phone]).    Do not eat or drink anything after midnight the night before your surgery, including candy, mints, lifesavers, or chewing gum. Do not drink alcohol 24hrs before your surgery. Try not to smoke at least 24hrs before your surgery.       Follow the pre surgery showering instructions as listed in the “My Surgical Experience  Booklet” or otherwise provided by your surgeon's office. Do not use a blade to shave the surgical area 1 week before surgery. It is okay to use a clean electric clippers up to 24 hours before surgery. Do not apply any lotions, creams, including makeup, cologne, deodorant, or perfumes after showering on the day of your surgery. Do not use dry shampoo, hair spray, hair gel, or any type of hair products.     No contact lenses, eye make-up, or artificial eyelashes. Remove nail polish, including gel polish, and any artificial, gel, or acrylic nails if possible. Remove all jewelry including rings and body piercing jewelry.     Wear causal clothing that is easy to take on and off. Consider your type of surgery.    Keep any valuables, jewelry, piercings at home. Please bring any specially ordered equipment (sling, braces) if indicated.    Arrange for a responsible person to drive you to and from the hospital on the day of your surgery. Please confirm the visitor policy for the day of your procedure when you receive your phone call with an arrival time.     Call the surgeon's office with any new illnesses, exposures, or additional questions prior to surgery.    Please reference your “My Surgical Experience Booklet” for additional information to prepare for your upcoming surgery.

## 2025-02-28 ENCOUNTER — TELEMEDICINE (OUTPATIENT)
Age: 60
End: 2025-02-28
Payer: COMMERCIAL

## 2025-02-28 DIAGNOSIS — M16.0 PRIMARY OSTEOARTHRITIS OF BOTH HIPS: Primary | ICD-10-CM

## 2025-02-28 DIAGNOSIS — Z01.818 PREOP EXAM FOR INTERNAL MEDICINE: ICD-10-CM

## 2025-02-28 DIAGNOSIS — E78.2 MIXED HYPERLIPIDEMIA: ICD-10-CM

## 2025-02-28 DIAGNOSIS — I10 ESSENTIAL HYPERTENSION: ICD-10-CM

## 2025-02-28 DIAGNOSIS — M25.552 LEFT HIP PAIN: ICD-10-CM

## 2025-02-28 DIAGNOSIS — M16.12 PRIMARY OSTEOARTHRITIS OF ONE HIP, LEFT: ICD-10-CM

## 2025-02-28 DIAGNOSIS — Z90.3 S/P GASTRIC SLEEVE PROCEDURE: ICD-10-CM

## 2025-02-28 PROCEDURE — 99205 OFFICE O/P NEW HI 60 MIN: CPT | Performed by: INTERNAL MEDICINE

## 2025-02-28 NOTE — ASSESSMENT & PLAN NOTE
Hold ACEI/ARB/diuretics to decrease risk of post operative RESHMA; post op add hydralazine prn for elevated blood pressures. May resume these meds upon discharge

## 2025-02-28 NOTE — PROGRESS NOTES
Internal Medicine Pre-Operative Evaluation:     Reason for Visit: Pre-operative Evaluation for Risk Stratification and Optimization    Patient ID: Brown Britt is a 59 y.o. male.       The Patient is located at Home and in the following state in which I hold an active license [place]    The patient was identified by name and date of birth Brown Britt was informed that this is a telemedicine visit and that the visit is being conducted through the Epic Embedded platform. He agrees to proceed..  My office door was closed and no one else was in the room.  Patient acknowledged consent and understanding of privacy and security of the video platform. The patient has agreed to participate and understands they can discontinue the visit at any time.    Patient is aware this is a billable service.      Case: [id number] Date/Time: 03/10/25 1015   Procedure: ARTHROPLASTY HIP TOTAL (Left: Hip)   Anesthesia type: Choice   Diagnosis: Primary osteoarthritis of one hip, left [M16.12]   Pre-op diagnosis: Primary osteoarthritis of one hip, left [M16.12]   Location: [facility] OR ROOM 01 / [facility]   Surgeons: Jack Rogers,          Recommendations to Proceed withSurgery    Patient is considered to be Low risk for Medium risk procedure.     After evaluation and discussion with patient with emphasis that all surgery has some degree of inherent risk it is acknowledged by patient this risk is Acceptable.    Patient is optimized and may proceed with planned procedure.     Assessment    Pre-operative Medical Evaluation for planned surgery  Recommendations as listed in PLAN section below  Contact surgical nurse  navigator with any questions regarding preoperative plan or schedule.      Assessment & Plan  Primary osteoarthritis of both hips  Failed outpatient conservative measures  Elected to undergo surgical intervention    Mixed hyperlipidemia  Continue low cholesterol diet and statin therapy    Essential  hypertension  Hold ACEI/ARB/diuretics to decrease risk of post operative RESHMA; post op add hydralazine prn for elevated blood pressures. May resume these meds upon discharge    S/P gastric sleeve procedure    Preop exam for internal medicine    Left hip pain    Primary osteoarthritis of one hip, left             Plan:     1. Further preoperative workup as follows:   - none no further testing required may proceed with surgery    2. Preoperative Medication Management Review performed by PAT nursing  YES    3. Patient requires further consultation with:   No Consults Required    4. Discharge Planning / Barriers to Discharge  none identified - patients has post discharge therapy plan in place, transportation arranged for discharge day, adequate family support at home to assist with discharge to home.        Subjective:           History of Present Illness:     Brown Britt is a 59 y.o. male who presents today for a preoperative consultation at the request of surgeon. The patient understands this is an elective procedure and not emergent. They are electing to undergo planned procedure with an understanding that all surgery has inherent risk. They have worked with their surgeon and failed conservative treatment measures. Today they present for preoperative risk assessment and recommendations for optimization in preparation for surgery.    Pt seen in center for perioperative medicine for upcoming proposed surgery. They have failed previous conservative measures and have elected surgical intervention.     Pt meets presurgical lab and BMI optimization goals.      Brown Britt has an IN HOSPITAL cardiac risk of RCI RISK CLASS I (0 risk factors, risk of major cardiac compl. appr. 0.5%) based on RCRI calculator    Cardiac Risk Estimation: per the Revised Cardiac Risk Index (Circ. 100:1043, 1999),         Pre-op Exam    Previous history of bleeding disorders or clots?: No  Previous Anesthesia reaction?: No  Prolonged steroid use  in the last 6 months?: No    Assessment of Cardiac Risk:   - Unstable or severe angina or MI in the last 6 weeks or history of stent placement in the last year?: No   - Decompensated heart failure (e.g. New onset heart failure, NYHA  Class IV heart failure, or worsening existing heart failure)?: No  - Significant arrhythmias such as high grade AV block, symptomatic ventricular arrhythmia, newly recognized ventricular tachycardia, supraventricular tachycardia with resting heart rate >100, or symptomatic bradycardia?: No  - Severe heart valve disease including aortic stenosis or symptomatic mitral stenosis?: No      Pre-operative Risk Factors:  Elevated-risk surgery: No    History of cerebrovascular disease: No    History of ischemic heart disease: No  Pre-operative treatment with insulin: No  Pre-operative creatinine >2 mg/dL: No    History of congestive heart failure: No        ROS: No TIA's or unusual headaches, no dysphagia.  No prolonged cough. No dyspnea or chest pain on exertion.  No abdominal pain, change in bowel habits, black or bloody stools.  No urinary tract or BPH symptoms.  Positive reported pain in arthritic joint. Positive difficulty with gait. No skin rashes or issues.        Physical Exam was not performed as this was a video/phone visit      The following portions of the patient's history were reviewed and updated as appropriate: allergies, current medications, past family history, past medical history, past social history, past surgical history and problem list.     Past History:       Past Medical History:   Diagnosis Date    Hypercholesterolemia     Vitamin deficiency     Past Surgical History:   Procedure Laterality Date    BARIATRIC SURGERY  03/01/2021    gastric sleeve    CATARACT EXTRACTION, BILATERAL      HERNIA REPAIR            Social History     Tobacco Use    Smoking status: Never     Passive exposure: Never    Smokeless tobacco: Never   Vaping Use    Vaping status: Never Used  "  Substance Use Topics    Alcohol use: Yes     Alcohol/week: 2.0 standard drinks of alcohol     Types: 2 Standard drinks or equivalent per week     Comment: occasionally    Drug use: Never     Family History   Problem Relation Age of Onset    Heart Valve Disease Mother     No Known Problems Father           Allergies:     Allergies   Allergen Reactions    Bee Venom Swelling     As child        Current Medications:     Current Outpatient Medications   Medication Instructions    ascorbic acid (VITAMIN C) 500 mg, Oral, 2 times daily    celecoxib (CELEBREX) 200 mg, Oral, Daily    diphenhydrAMINE-acetaminophen (TYLENOL PM)  MG TABS 1 tablet, Daily at bedtime PRN    ferrous sulfate 324 mg, Oral, Daily before breakfast    folic acid (FOLVITE) 1 mg, Oral, Daily    losartan (COZAAR) 25 mg, Oral, Daily    Multiple Vitamins-Minerals (multivitamin with minerals) tablet 1 tablet, Oral, Daily    mupirocin (BACTROBAN) 2 % ointment Apply topically to the inside of the left and right nostrils twice daily for 5 days before surgery, including the morning of surgery.    Prenatal Vit-Fe Fumarate-FA (WesTab Plus) 27-1 MG TABS 1 tablet, Daily    simvastatin (ZOCOR) 20 mg, Oral, Daily at bedtime    terbinafine (LAMISIL) 250 mg, Oral, Daily    Vitamin D, Ergocalciferol, 81838 units CAPS 1 capsule, Oral, Weekly           PRE-OP WORKSHEET DATA    Assessment of Pre-Operative Risks     MLJ Quality Hard Stops:    BMI (<40) : Estimated body mass index is 39.17 kg/m² as calculated from the following:    Height as of 2/27/25: 5' 10\" (1.778 m).    Weight as of 2/27/25: 124 kg (273 lb).    Hgb ( >11):   Lab Results   Component Value Date    HGB 16.1 02/20/2025    HGB 15.0 02/24/2024    HGB 16.3 02/08/2023       HbA1c (<7.5) :   Lab Results   Component Value Date    HGBA1C 6.4 (H) 02/20/2025       GFR (>60) (Less then 45 = Nephrology consult):    Lab Results   Component Value Date    EGFR 80 02/20/2025    EGFR 69 11/03/2024    EGFR 97 02/24/2024 "            Pre-Op Data Reviewed:       Laboratory Results: I have personally reviewed the pertinent laboratory results/reports     EKG:I have personally reviewed pertinent reports.  . I personally reviewed and interpreted available tracings in the electronic medical record    Encounter Date: 02/20/25   ECG 12 lead   Result Value    Ventricular Rate 74    Atrial Rate 74    MI Interval 140    QRSD Interval 88    QT Interval 414    QTC Interval 460    P Axis 18    QRS Axis 11    T Wave Axis 12    Narrative    Normal sinus rhythm  Normal ECG  When compared with ECG of 27-Jan-2023 13:23,  No significant change was found  Confirmed by Margarito Pickens ([id number]) on 2/20/2025 11:48:25 AM       OLD RECORDS: reviewed old records in the chart review section if EHR on day of visit.    Previous cardiopulmonary studies within the past year:  Echocardiogram: no   Cardiac Catheterization: no  Stress Test: yes,   Resting ECG: ECG is normal. The ECG shows normal sinus rhythm. Occasional PVCs.    Stress ECG: A Bill protocol stress test was performed. The patient reached stage 4.0 of the protocol after exercising for 9 min and 6 sec and had a maximal HR of 150 bpm (91 % of MPHR) and 10.2 METS. The patient reached the end of the protocol. The patient reported dyspnea during the stress test. Symptoms began during stress and ended during recovery.    Stress ECG: No ST deviation is noted. Arrhythmias during stress: occasional PVCs. The ECG was negative for ischemia.      Time of visit including pre-visit chart review, visit and post-visit coordination of plan and care , review of pre-surgical lab work, preparation and time spent documenting note in electronic medical record, time spent answering patient questions by care team 35 minutes             [facility]

## 2025-02-28 NOTE — H&P (VIEW-ONLY)
Internal Medicine Pre-Operative Evaluation:     Reason for Visit: Pre-operative Evaluation for Risk Stratification and Optimization    Patient ID: Brown Britt is a 59 y.o. male.       The Patient is located at Home and in the following state in which I hold an active license [place]    The patient was identified by name and date of birth Brown Britt was informed that this is a telemedicine visit and that the visit is being conducted through the Epic Embedded platform. He agrees to proceed..  My office door was closed and no one else was in the room.  Patient acknowledged consent and understanding of privacy and security of the video platform. The patient has agreed to participate and understands they can discontinue the visit at any time.    Patient is aware this is a billable service.      Case: [id number] Date/Time: 03/10/25 1015   Procedure: ARTHROPLASTY HIP TOTAL (Left: Hip)   Anesthesia type: Choice   Diagnosis: Primary osteoarthritis of one hip, left [M16.12]   Pre-op diagnosis: Primary osteoarthritis of one hip, left [M16.12]   Location: [facility] OR ROOM 01 / [facility]   Surgeons: Jack Rogers,          Recommendations to Proceed withSurgery    Patient is considered to be Low risk for Medium risk procedure.     After evaluation and discussion with patient with emphasis that all surgery has some degree of inherent risk it is acknowledged by patient this risk is Acceptable.    Patient is optimized and may proceed with planned procedure.     Assessment    Pre-operative Medical Evaluation for planned surgery  Recommendations as listed in PLAN section below  Contact surgical nurse  navigator with any questions regarding preoperative plan or schedule.      Assessment & Plan  Primary osteoarthritis of both hips  Failed outpatient conservative measures  Elected to undergo surgical intervention    Mixed hyperlipidemia  Continue low cholesterol diet and statin therapy    Essential  hypertension  Hold ACEI/ARB/diuretics to decrease risk of post operative RESHMA; post op add hydralazine prn for elevated blood pressures. May resume these meds upon discharge    S/P gastric sleeve procedure    Preop exam for internal medicine    Left hip pain    Primary osteoarthritis of one hip, left             Plan:     1. Further preoperative workup as follows:   - none no further testing required may proceed with surgery    2. Preoperative Medication Management Review performed by PAT nursing  YES    3. Patient requires further consultation with:   No Consults Required    4. Discharge Planning / Barriers to Discharge  none identified - patients has post discharge therapy plan in place, transportation arranged for discharge day, adequate family support at home to assist with discharge to home.        Subjective:           History of Present Illness:     Brown Britt is a 59 y.o. male who presents today for a preoperative consultation at the request of surgeon. The patient understands this is an elective procedure and not emergent. They are electing to undergo planned procedure with an understanding that all surgery has inherent risk. They have worked with their surgeon and failed conservative treatment measures. Today they present for preoperative risk assessment and recommendations for optimization in preparation for surgery.    Pt seen in center for perioperative medicine for upcoming proposed surgery. They have failed previous conservative measures and have elected surgical intervention.     Pt meets presurgical lab and BMI optimization goals.      Brown Britt has an IN HOSPITAL cardiac risk of RCI RISK CLASS I (0 risk factors, risk of major cardiac compl. appr. 0.5%) based on RCRI calculator    Cardiac Risk Estimation: per the Revised Cardiac Risk Index (Circ. 100:1043, 1999),         Pre-op Exam    Previous history of bleeding disorders or clots?: No  Previous Anesthesia reaction?: No  Prolonged steroid use  in the last 6 months?: No    Assessment of Cardiac Risk:   - Unstable or severe angina or MI in the last 6 weeks or history of stent placement in the last year?: No   - Decompensated heart failure (e.g. New onset heart failure, NYHA  Class IV heart failure, or worsening existing heart failure)?: No  - Significant arrhythmias such as high grade AV block, symptomatic ventricular arrhythmia, newly recognized ventricular tachycardia, supraventricular tachycardia with resting heart rate >100, or symptomatic bradycardia?: No  - Severe heart valve disease including aortic stenosis or symptomatic mitral stenosis?: No      Pre-operative Risk Factors:  Elevated-risk surgery: No    History of cerebrovascular disease: No    History of ischemic heart disease: No  Pre-operative treatment with insulin: No  Pre-operative creatinine >2 mg/dL: No    History of congestive heart failure: No        ROS: No TIA's or unusual headaches, no dysphagia.  No prolonged cough. No dyspnea or chest pain on exertion.  No abdominal pain, change in bowel habits, black or bloody stools.  No urinary tract or BPH symptoms.  Positive reported pain in arthritic joint. Positive difficulty with gait. No skin rashes or issues.        Physical Exam was not performed as this was a video/phone visit      The following portions of the patient's history were reviewed and updated as appropriate: allergies, current medications, past family history, past medical history, past social history, past surgical history and problem list.     Past History:       Past Medical History:   Diagnosis Date    Hypercholesterolemia     Vitamin deficiency     Past Surgical History:   Procedure Laterality Date    BARIATRIC SURGERY  03/01/2021    gastric sleeve    CATARACT EXTRACTION, BILATERAL      HERNIA REPAIR            Social History     Tobacco Use    Smoking status: Never     Passive exposure: Never    Smokeless tobacco: Never   Vaping Use    Vaping status: Never Used  "  Substance Use Topics    Alcohol use: Yes     Alcohol/week: 2.0 standard drinks of alcohol     Types: 2 Standard drinks or equivalent per week     Comment: occasionally    Drug use: Never     Family History   Problem Relation Age of Onset    Heart Valve Disease Mother     No Known Problems Father           Allergies:     Allergies   Allergen Reactions    Bee Venom Swelling     As child        Current Medications:     Current Outpatient Medications   Medication Instructions    ascorbic acid (VITAMIN C) 500 mg, Oral, 2 times daily    celecoxib (CELEBREX) 200 mg, Oral, Daily    diphenhydrAMINE-acetaminophen (TYLENOL PM)  MG TABS 1 tablet, Daily at bedtime PRN    ferrous sulfate 324 mg, Oral, Daily before breakfast    folic acid (FOLVITE) 1 mg, Oral, Daily    losartan (COZAAR) 25 mg, Oral, Daily    Multiple Vitamins-Minerals (multivitamin with minerals) tablet 1 tablet, Oral, Daily    mupirocin (BACTROBAN) 2 % ointment Apply topically to the inside of the left and right nostrils twice daily for 5 days before surgery, including the morning of surgery.    Prenatal Vit-Fe Fumarate-FA (WesTab Plus) 27-1 MG TABS 1 tablet, Daily    simvastatin (ZOCOR) 20 mg, Oral, Daily at bedtime    terbinafine (LAMISIL) 250 mg, Oral, Daily    Vitamin D, Ergocalciferol, 70426 units CAPS 1 capsule, Oral, Weekly           PRE-OP WORKSHEET DATA    Assessment of Pre-Operative Risks     MLJ Quality Hard Stops:    BMI (<40) : Estimated body mass index is 39.17 kg/m² as calculated from the following:    Height as of 2/27/25: 5' 10\" (1.778 m).    Weight as of 2/27/25: 124 kg (273 lb).    Hgb ( >11):   Lab Results   Component Value Date    HGB 16.1 02/20/2025    HGB 15.0 02/24/2024    HGB 16.3 02/08/2023       HbA1c (<7.5) :   Lab Results   Component Value Date    HGBA1C 6.4 (H) 02/20/2025       GFR (>60) (Less then 45 = Nephrology consult):    Lab Results   Component Value Date    EGFR 80 02/20/2025    EGFR 69 11/03/2024    EGFR 97 02/24/2024 "            Pre-Op Data Reviewed:       Laboratory Results: I have personally reviewed the pertinent laboratory results/reports     EKG:I have personally reviewed pertinent reports.  . I personally reviewed and interpreted available tracings in the electronic medical record    Encounter Date: 02/20/25   ECG 12 lead   Result Value    Ventricular Rate 74    Atrial Rate 74    FL Interval 140    QRSD Interval 88    QT Interval 414    QTC Interval 460    P Axis 18    QRS Axis 11    T Wave Axis 12    Narrative    Normal sinus rhythm  Normal ECG  When compared with ECG of 27-Jan-2023 13:23,  No significant change was found  Confirmed by Margarito Pickens ([id number]) on 2/20/2025 11:48:25 AM       OLD RECORDS: reviewed old records in the chart review section if EHR on day of visit.    Previous cardiopulmonary studies within the past year:  Echocardiogram: no   Cardiac Catheterization: no  Stress Test: yes,   Resting ECG: ECG is normal. The ECG shows normal sinus rhythm. Occasional PVCs.    Stress ECG: A Bill protocol stress test was performed. The patient reached stage 4.0 of the protocol after exercising for 9 min and 6 sec and had a maximal HR of 150 bpm (91 % of MPHR) and 10.2 METS. The patient reached the end of the protocol. The patient reported dyspnea during the stress test. Symptoms began during stress and ended during recovery.    Stress ECG: No ST deviation is noted. Arrhythmias during stress: occasional PVCs. The ECG was negative for ischemia.      Time of visit including pre-visit chart review, visit and post-visit coordination of plan and care , review of pre-surgical lab work, preparation and time spent documenting note in electronic medical record, time spent answering patient questions by care team 35 minutes             [facility]

## 2025-02-28 NOTE — PATIENT INSTRUCTIONS
BEFORE SURGERY    Contact your surgical nurse navigator or surgical provider with any questions regarding preoperative plan or schedule.  Stop all over the counter supplements, herbal, naturopathic  medications for 1 week prior to surgery UNLESS prescribed by your surgeon  Hold NSAIDS (i.e. advil, alleve, motrin, ibuprofen, celebrex) minimum 5 days prior to surgery  Follow presurgical medication instructions provided by preadmission nursing team reviewed during your presurgery phone call  Strategies for optimizing your surgery through breathing exercises, nutrition and physical activity can be found at [email or web address]/best  Call [phone] with any presurgical concerns or medications questions or use the messaging feature in your G[facility] casper to contact your provider    AFTER SURGERY    Recommend using Tylenol ( acetaminophen ) 1000 mg every eight hours during the first week post discharge along with icing the area for 20 mins every 3-4 hours while awake can be helpful in reducing your need for post operative opioid use. This opioid sparing plan can be used along side your surgeons pain plan.  Use stool softener over the counter (colace) daily after surgery during the first 1-2 weeks to avoid post operative constipation issues  If no bowel movement within 3 days after surgery then use over the counter Miralax in addition to your stool softener   If cleared by your surgical team for activity then early and often walking is encouraged and can be important in prevention of post surgical blood clots. Additionally spend as much time out of bed as possible and allowed by your surgical team  Use your incentive spirometer twice per hour in the first seven days after surgery to help prevent post surgery lung complications and infections  It is very important you follow the instructions from your surgeon regarding any medications for after surgery blood clot prevention. Compliance with these medications or interventions is very  important.  Call [phone] with any post discharge concerns or medical issues or use the messaging feature in your [facility] casper to contact your provider

## 2025-03-03 ENCOUNTER — OFFICE VISIT (OUTPATIENT)
Dept: PODIATRY | Facility: CLINIC | Age: 60
End: 2025-03-03
Payer: COMMERCIAL

## 2025-03-03 VITALS
WEIGHT: 273 LBS | HEIGHT: 70 IN | RESPIRATION RATE: 16 BRPM | HEART RATE: 86 BPM | TEMPERATURE: 97.3 F | OXYGEN SATURATION: 98 % | BODY MASS INDEX: 39.08 KG/M2

## 2025-03-03 DIAGNOSIS — M79.674 PAIN IN TOES OF BOTH FEET: ICD-10-CM

## 2025-03-03 DIAGNOSIS — M79.675 PAIN IN TOES OF BOTH FEET: ICD-10-CM

## 2025-03-03 DIAGNOSIS — I73.9 PVD (PERIPHERAL VASCULAR DISEASE) (HCC): Primary | ICD-10-CM

## 2025-03-03 DIAGNOSIS — B35.1 ONYCHOMYCOSIS: ICD-10-CM

## 2025-03-03 PROCEDURE — RECHECK: Performed by: PODIATRIST

## 2025-03-03 PROCEDURE — 11721 DEBRIDE NAIL 6 OR MORE: CPT | Performed by: PODIATRIST

## 2025-03-03 NOTE — PROGRESS NOTES
Name: Brown Britt      : 1965      MRN: [id number]  Encounter Provider: Jeffrey Killian DPM  Encounter Date: 3/3/2025   Encounter department: [facility] PODIATRY [place]  :  Assessment & Plan  PVD (peripheral vascular disease) ([facility])         Onychomycosis  Patient was informed that he has 2 refills left on the Lamisil tablets.  He is to finish the entire prescription in order for the medication to work properly.     Debride mycotic nails and thin the nail plates x 6 with the use of a nail nipper manually and an electric Dremel bur was used to reduce the thickness of the nail beds and smoothed the distal aspect of the nails.   Pain in toes of both feet         Discussed proper shoe gear, daily inspections of feet, and general foot health with patient. Patient has Q8  findings and is recommended for at risk foot care every 9-10 weeks.    Patients most recent complete clinical foot exam was on: 2024    Return in about 10 weeks (around 2025).     History of Present Illness   HPI  Brown Britt is a 59 y.o. male who presents with chief complaint of painful thick nails on both feet.  Patient states that he only took 1 bottle of the medication.  He states he has had no side effects with it.Patient presents for at-risk foot care.  Patient has no acute concerns today.  Patient has significant lower extremity risk due to neuropathy, parasthesia, edema, and trophic skin changes to the lower extremity.   History obtained from: patient    Review of Systems  Medical History Reviewed by provider this encounter:     .  Current Outpatient Medications on File Prior to Visit   Medication Sig Dispense Refill    ascorbic acid (VITAMIN C) 500 MG tablet Take 1 tablet (500 mg total) by mouth 2 (two) times a day 60 tablet 1    celecoxib (CeleBREX) 200 mg capsule Take 1 capsule (200 mg total) by mouth daily 90 capsule 1    diphenhydrAMINE-acetaminophen (TYLENOL PM)  MG TABS Take 1 tablet by mouth daily at  "bedtime as needed for sleep      ferrous sulfate 324 (65 Fe) mg Take 1 tablet (324 mg total) by mouth daily before breakfast 30 tablet 1    folic acid (FOLVITE) 1 mg tablet Take 1 tablet (1 mg total) by mouth daily 30 tablet 1    losartan (COZAAR) 25 mg tablet Take 1 tablet (25 mg total) by mouth daily 90 tablet 0    Multiple Vitamins-Minerals (multivitamin with minerals) tablet Take 1 tablet by mouth daily 30 tablet 1    mupirocin (BACTROBAN) 2 % ointment Apply topically to the inside of the left and right nostrils twice daily for 5 days before surgery, including the morning of surgery. 15 g 1    Prenatal Vit-Fe Fumarate-FA (WesTab Plus) 27-1 MG TABS Take 1 tablet by mouth in the morning      simvastatin (ZOCOR) 20 mg tablet Take 1 tablet (20 mg total) by mouth daily at bedtime 90 tablet 3    terbinafine (LamISIL) 250 mg tablet Take 1 tablet (250 mg total) by mouth daily 30 tablet 2    Vitamin D, Ergocalciferol, 44412 units CAPS Take 1 capsule by mouth once a week 12 capsule 0     No current facility-administered medications on file prior to visit.      Social History     Tobacco Use    Smoking status: Never     Passive exposure: Never    Smokeless tobacco: Never   Vaping Use    Vaping status: Never Used   Substance and Sexual Activity    Alcohol use: Yes     Alcohol/week: 2.0 standard drinks of alcohol     Types: 2 Standard drinks or equivalent per week     Comment: occasionally    Drug use: Never    Sexual activity: Yes     Partners: Female        Objective   Pulse 86   Temp (!) 97.3 °F (36.3 °C) (Temporal)   Resp 16   Ht 5' 10\" (1.778 m)   Wt 124 kg (273 lb)   SpO2 98%   BMI 39.17 kg/m²      Physical Exam  Vascular status is a faint 1/4 DP 0/4 PT negative digital hair normal distal cooling, immediate capillary refill bilaterally.  Capillary refill is approximately 2 to 3 seconds.    Derm nails are brittle elongated atrophic white-yellow discoloration with subungual debris x 6.  There is no increased " thickness and the nails are approximately 1 to 2 mm there is a small amount of clearing noted on the second digit of the right foot approximately 1 mm.    Administrative Statements   I have spent a total time of 15 minutes in caring for this patient on the day of the visit/encounter including Risks and benefits of tx options, Instructions for management, Patient and family education, Importance of tx compliance, Risk factor reductions, Counseling / Coordination of care, Documenting in the medical record, and Obtaining or reviewing history  .

## 2025-03-06 DIAGNOSIS — M16.12 PRIMARY OSTEOARTHRITIS OF ONE HIP, LEFT: ICD-10-CM

## 2025-03-06 DIAGNOSIS — M25.552 LEFT HIP PAIN: ICD-10-CM

## 2025-03-07 RX ORDER — FERROUS SULFATE 324(65)MG
324 TABLET, DELAYED RELEASE (ENTERIC COATED) ORAL
Qty: 30 TABLET | Refills: 1 | Status: SHIPPED | OUTPATIENT
Start: 2025-03-07 | End: 2025-05-06

## 2025-03-08 DIAGNOSIS — M25.552 LEFT HIP PAIN: ICD-10-CM

## 2025-03-08 DIAGNOSIS — M16.12 UNILATERAL PRIMARY OSTEOARTHRITIS, LEFT HIP: ICD-10-CM

## 2025-03-08 DIAGNOSIS — M16.12 PRIMARY OSTEOARTHRITIS OF ONE HIP, LEFT: ICD-10-CM

## 2025-03-08 DIAGNOSIS — M25.552 PAIN IN LEFT HIP: ICD-10-CM

## 2025-03-10 ENCOUNTER — APPOINTMENT (OUTPATIENT)
Dept: RADIOLOGY | Facility: HOSPITAL | Age: 60
End: 2025-03-10
Payer: COMMERCIAL

## 2025-03-10 ENCOUNTER — HOSPITAL ENCOUNTER (OUTPATIENT)
Facility: HOSPITAL | Age: 60
Setting detail: OUTPATIENT SURGERY
Discharge: HOME | End: 2025-03-10
Attending: STUDENT IN AN ORGANIZED HEALTH CARE EDUCATION/TRAINING PROGRAM | Admitting: STUDENT IN AN ORGANIZED HEALTH CARE EDUCATION/TRAINING PROGRAM
Payer: COMMERCIAL

## 2025-03-10 ENCOUNTER — ANESTHESIA EVENT (OUTPATIENT)
Dept: PERIOP | Facility: HOSPITAL | Age: 60
End: 2025-03-10
Payer: COMMERCIAL

## 2025-03-10 ENCOUNTER — ANESTHESIA (OUTPATIENT)
Dept: PERIOP | Facility: HOSPITAL | Age: 60
End: 2025-03-10
Payer: COMMERCIAL

## 2025-03-10 VITALS
HEIGHT: 70 IN | RESPIRATION RATE: 18 BRPM | BODY MASS INDEX: 39.08 KG/M2 | DIASTOLIC BLOOD PRESSURE: 73 MMHG | OXYGEN SATURATION: 96 % | SYSTOLIC BLOOD PRESSURE: 114 MMHG | HEART RATE: 87 BPM | TEMPERATURE: 97.9 F | WEIGHT: 273 LBS

## 2025-03-10 DIAGNOSIS — M16.12 PRIMARY OSTEOARTHRITIS OF ONE HIP, LEFT: Primary | ICD-10-CM

## 2025-03-10 LAB
ABO GROUP BLD: NORMAL
GLUCOSE SERPL-MCNC: 120 MG/DL (ref 65–140)
RH BLD: POSITIVE

## 2025-03-10 PROCEDURE — C1776 JOINT DEVICE (IMPLANTABLE): HCPCS | Performed by: STUDENT IN AN ORGANIZED HEALTH CARE EDUCATION/TRAINING PROGRAM

## 2025-03-10 PROCEDURE — 97535 SELF CARE MNGMENT TRAINING: CPT

## 2025-03-10 PROCEDURE — C1713 ANCHOR/SCREW BN/BN,TIS/BN: HCPCS | Performed by: STUDENT IN AN ORGANIZED HEALTH CARE EDUCATION/TRAINING PROGRAM

## 2025-03-10 PROCEDURE — 27130 TOTAL HIP ARTHROPLASTY: CPT | Performed by: STUDENT IN AN ORGANIZED HEALTH CARE EDUCATION/TRAINING PROGRAM

## 2025-03-10 PROCEDURE — 97162 PT EVAL MOD COMPLEX 30 MIN: CPT

## 2025-03-10 PROCEDURE — 97165 OT EVAL LOW COMPLEX 30 MIN: CPT

## 2025-03-10 PROCEDURE — 72170 X-RAY EXAM OF PELVIS: CPT

## 2025-03-10 PROCEDURE — 82948 REAGENT STRIP/BLOOD GLUCOSE: CPT

## 2025-03-10 PROCEDURE — 27130 TOTAL HIP ARTHROPLASTY: CPT

## 2025-03-10 DEVICE — PINNACLE HIP SOLUTIONS ALTRX LD POLYETHYLENE ACETABULAR LINER +4 10 DEGREE 40MM ID 58MM OD
Type: IMPLANTABLE DEVICE | Site: HIP | Status: FUNCTIONAL
Brand: PINNACLE ALTRX

## 2025-03-10 DEVICE — ACTIS DUOFIX HIP PROSTHESIS (FEMORAL STEM 12/14 TAPER CEMENTLESS SIZE 8 HIGH COLLAR)  CE
Type: IMPLANTABLE DEVICE | Site: HIP | Status: FUNCTIONAL
Brand: ACTIS

## 2025-03-10 DEVICE — PINNACLE CANCELLOUS BONE SCREW 6.5MM X 30MM
Type: IMPLANTABLE DEVICE | Site: HIP | Status: FUNCTIONAL
Brand: PINNACLE

## 2025-03-10 DEVICE — PINNACLE GRIPTION ACETABULAR SHELL SECTOR 58MM OD
Type: IMPLANTABLE DEVICE | Site: HIP | Status: FUNCTIONAL
Brand: PINNACLE GRIPTION

## 2025-03-10 DEVICE — BIOLOX DELTA TS CERAMIC FEMORAL HEAD 12/14 TAPER REVISION DIAMETER 40MM +1.5
Type: IMPLANTABLE DEVICE | Site: HIP | Status: FUNCTIONAL
Brand: BIOLOX DELTA

## 2025-03-10 DEVICE — PINNACLE CANCELLOUS BONE SCREW 6.5MM X 20MM
Type: IMPLANTABLE DEVICE | Site: HIP | Status: FUNCTIONAL
Brand: PINNACLE

## 2025-03-10 RX ORDER — OXYCODONE HYDROCHLORIDE 5 MG/1
5 TABLET ORAL EVERY 6 HOURS PRN
Qty: 30 TABLET | Refills: 0 | Status: SHIPPED | OUTPATIENT
Start: 2025-03-10 | End: 2025-03-20

## 2025-03-10 RX ORDER — ONDANSETRON 4 MG/1
4 TABLET, ORALLY DISINTEGRATING ORAL EVERY 6 HOURS PRN
Qty: 15 TABLET | Refills: 0 | Status: SHIPPED | OUTPATIENT
Start: 2025-03-10 | End: 2025-03-24

## 2025-03-10 RX ORDER — ACETAMINOPHEN 325 MG/1
975 TABLET ORAL ONCE
Status: COMPLETED | OUTPATIENT
Start: 2025-03-10 | End: 2025-03-10

## 2025-03-10 RX ORDER — CHLORHEXIDINE GLUCONATE 40 MG/ML
SOLUTION TOPICAL DAILY PRN
Status: DISCONTINUED | OUTPATIENT
Start: 2025-03-10 | End: 2025-03-10 | Stop reason: HOSPADM

## 2025-03-10 RX ORDER — TRANEXAMIC ACID 10 MG/ML
1000 INJECTION, SOLUTION INTRAVENOUS ONCE
Status: COMPLETED | OUTPATIENT
Start: 2025-03-10 | End: 2025-03-10

## 2025-03-10 RX ORDER — CEFAZOLIN SODIUM 1 G/3ML
INJECTION, POWDER, FOR SOLUTION INTRAMUSCULAR; INTRAVENOUS AS NEEDED
Status: DISCONTINUED | OUTPATIENT
Start: 2025-03-10 | End: 2025-03-10

## 2025-03-10 RX ORDER — FERROUS SULFATE 325(65) MG
325 TABLET ORAL 2 TIMES DAILY WITH MEALS
Status: CANCELLED | OUTPATIENT
Start: 2025-03-10

## 2025-03-10 RX ORDER — ONDANSETRON 2 MG/ML
4 INJECTION INTRAMUSCULAR; INTRAVENOUS EVERY 6 HOURS PRN
Status: CANCELLED | OUTPATIENT
Start: 2025-03-10

## 2025-03-10 RX ORDER — MIDAZOLAM HYDROCHLORIDE 2 MG/2ML
INJECTION, SOLUTION INTRAMUSCULAR; INTRAVENOUS AS NEEDED
Status: DISCONTINUED | OUTPATIENT
Start: 2025-03-10 | End: 2025-03-10

## 2025-03-10 RX ORDER — FOLIC ACID 1 MG/1
1 TABLET ORAL DAILY
Status: CANCELLED | OUTPATIENT
Start: 2025-03-10

## 2025-03-10 RX ORDER — HYDROMORPHONE HCL/PF 1 MG/ML
0.5 SYRINGE (ML) INJECTION EVERY 2 HOUR PRN
Refills: 0 | Status: CANCELLED | OUTPATIENT
Start: 2025-03-10 | End: 2025-03-12

## 2025-03-10 RX ORDER — KETOROLAC TROMETHAMINE 30 MG/ML
INJECTION, SOLUTION INTRAMUSCULAR; INTRAVENOUS AS NEEDED
Status: DISCONTINUED | OUTPATIENT
Start: 2025-03-10 | End: 2025-03-10 | Stop reason: HOSPADM

## 2025-03-10 RX ORDER — CALCIUM CARBONATE 500 MG/1
1000 TABLET, CHEWABLE ORAL DAILY PRN
Status: CANCELLED | OUTPATIENT
Start: 2025-03-10

## 2025-03-10 RX ORDER — SODIUM CHLORIDE, SODIUM LACTATE, POTASSIUM CHLORIDE, CALCIUM CHLORIDE 600; 310; 30; 20 MG/100ML; MG/100ML; MG/100ML; MG/100ML
INJECTION, SOLUTION INTRAVENOUS CONTINUOUS PRN
Status: DISCONTINUED | OUTPATIENT
Start: 2025-03-10 | End: 2025-03-10

## 2025-03-10 RX ORDER — CELECOXIB 100 MG/1
100 CAPSULE ORAL 2 TIMES DAILY
Qty: 28 CAPSULE | Refills: 0 | Status: SHIPPED | OUTPATIENT
Start: 2025-03-10 | End: 2025-03-24

## 2025-03-10 RX ORDER — DEXAMETHASONE SODIUM PHOSPHATE 10 MG/ML
INJECTION, SOLUTION INTRAMUSCULAR; INTRAVENOUS AS NEEDED
Status: DISCONTINUED | OUTPATIENT
Start: 2025-03-10 | End: 2025-03-10

## 2025-03-10 RX ORDER — MAGNESIUM HYDROXIDE 1200 MG/15ML
LIQUID ORAL AS NEEDED
Status: DISCONTINUED | OUTPATIENT
Start: 2025-03-10 | End: 2025-03-10 | Stop reason: HOSPADM

## 2025-03-10 RX ORDER — FENTANYL CITRATE/PF 50 MCG/ML
50 SYRINGE (ML) INJECTION
Status: DISCONTINUED | OUTPATIENT
Start: 2025-03-10 | End: 2025-03-10 | Stop reason: HOSPADM

## 2025-03-10 RX ORDER — PROPOFOL 10 MG/ML
INJECTION, EMULSION INTRAVENOUS CONTINUOUS PRN
Status: DISCONTINUED | OUTPATIENT
Start: 2025-03-10 | End: 2025-03-10

## 2025-03-10 RX ORDER — ONDANSETRON 2 MG/ML
4 INJECTION INTRAMUSCULAR; INTRAVENOUS ONCE AS NEEDED
Status: DISCONTINUED | OUTPATIENT
Start: 2025-03-10 | End: 2025-03-10 | Stop reason: HOSPADM

## 2025-03-10 RX ORDER — SENNOSIDES 8.6 MG
1 TABLET ORAL DAILY
Status: CANCELLED | OUTPATIENT
Start: 2025-03-10

## 2025-03-10 RX ORDER — ACETAMINOPHEN 325 MG/1
975 TABLET ORAL EVERY 8 HOURS
Status: CANCELLED | OUTPATIENT
Start: 2025-03-10

## 2025-03-10 RX ORDER — CEFAZOLIN SODIUM 2 G/50ML
2000 SOLUTION INTRAVENOUS EVERY 8 HOURS
Status: CANCELLED | OUTPATIENT
Start: 2025-03-10 | End: 2025-03-11

## 2025-03-10 RX ORDER — ONDANSETRON 2 MG/ML
INJECTION INTRAMUSCULAR; INTRAVENOUS AS NEEDED
Status: DISCONTINUED | OUTPATIENT
Start: 2025-03-10 | End: 2025-03-10

## 2025-03-10 RX ORDER — HYDROMORPHONE HCL/PF 1 MG/ML
SYRINGE (ML) INJECTION AS NEEDED
Status: DISCONTINUED | OUTPATIENT
Start: 2025-03-10 | End: 2025-03-10

## 2025-03-10 RX ORDER — OXYCODONE HYDROCHLORIDE 5 MG/1
5 TABLET ORAL EVERY 4 HOURS PRN
Status: DISCONTINUED | OUTPATIENT
Start: 2025-03-10 | End: 2025-03-10 | Stop reason: HOSPADM

## 2025-03-10 RX ORDER — BUPIVACAINE HYDROCHLORIDE 7.5 MG/ML
INJECTION, SOLUTION INTRASPINAL AS NEEDED
Status: DISCONTINUED | OUTPATIENT
Start: 2025-03-10 | End: 2025-03-10

## 2025-03-10 RX ORDER — ASPIRIN 81 MG/1
81 TABLET ORAL 2 TIMES DAILY
Qty: 56 TABLET | Refills: 0 | Status: SHIPPED | OUTPATIENT
Start: 2025-03-10 | End: 2025-04-07

## 2025-03-10 RX ORDER — SODIUM CHLORIDE, SODIUM LACTATE, POTASSIUM CHLORIDE, CALCIUM CHLORIDE 600; 310; 30; 20 MG/100ML; MG/100ML; MG/100ML; MG/100ML
125 INJECTION, SOLUTION INTRAVENOUS CONTINUOUS
Status: DISCONTINUED | OUTPATIENT
Start: 2025-03-10 | End: 2025-03-10 | Stop reason: HOSPADM

## 2025-03-10 RX ORDER — CHLORHEXIDINE GLUCONATE ORAL RINSE 1.2 MG/ML
15 SOLUTION DENTAL ONCE
Status: COMPLETED | OUTPATIENT
Start: 2025-03-10 | End: 2025-03-10

## 2025-03-10 RX ORDER — SENNA AND DOCUSATE SODIUM 50; 8.6 MG/1; MG/1
1 TABLET, FILM COATED ORAL DAILY
Qty: 14 TABLET | Refills: 0 | Status: SHIPPED | OUTPATIENT
Start: 2025-03-10 | End: 2025-03-24

## 2025-03-10 RX ORDER — HYDROMORPHONE HCL/PF 1 MG/ML
0.5 SYRINGE (ML) INJECTION
Status: DISCONTINUED | OUTPATIENT
Start: 2025-03-10 | End: 2025-03-10 | Stop reason: HOSPADM

## 2025-03-10 RX ORDER — ASCORBIC ACID 500 MG
500 TABLET ORAL 2 TIMES DAILY
Status: CANCELLED | OUTPATIENT
Start: 2025-03-10

## 2025-03-10 RX ORDER — LABETALOL HYDROCHLORIDE 5 MG/ML
INJECTION, SOLUTION INTRAVENOUS AS NEEDED
Status: DISCONTINUED | OUTPATIENT
Start: 2025-03-10 | End: 2025-03-10

## 2025-03-10 RX ORDER — OXYCODONE HYDROCHLORIDE 5 MG/1
10 TABLET ORAL EVERY 4 HOURS PRN
Status: CANCELLED | OUTPATIENT
Start: 2025-03-10

## 2025-03-10 RX ORDER — BUPIVACAINE HYDROCHLORIDE AND EPINEPHRINE 2.5; 5 MG/ML; UG/ML
INJECTION, SOLUTION EPIDURAL; INFILTRATION; INTRACAUDAL; PERINEURAL AS NEEDED
Status: DISCONTINUED | OUTPATIENT
Start: 2025-03-10 | End: 2025-03-10 | Stop reason: HOSPADM

## 2025-03-10 RX ORDER — ACETAMINOPHEN 500 MG
1000 TABLET ORAL EVERY 8 HOURS
Qty: 60 TABLET | Refills: 0 | Status: SHIPPED | OUTPATIENT
Start: 2025-03-10 | End: 2025-03-20

## 2025-03-10 RX ORDER — CELECOXIB 200 MG/1
200 CAPSULE ORAL ONCE
Status: DISCONTINUED | OUTPATIENT
Start: 2025-03-10 | End: 2025-03-10

## 2025-03-10 RX ORDER — CEFAZOLIN SODIUM 2 G/50ML
2000 SOLUTION INTRAVENOUS
Status: COMPLETED | OUTPATIENT
Start: 2025-03-10 | End: 2025-03-10

## 2025-03-10 RX ORDER — CEFADROXIL 500 MG/1
500 CAPSULE ORAL EVERY 12 HOURS SCHEDULED
Qty: 10 CAPSULE | Refills: 0 | Status: SHIPPED | OUTPATIENT
Start: 2025-03-10 | End: 2025-03-15

## 2025-03-10 RX ORDER — ASPIRIN 81 MG/1
81 TABLET ORAL 2 TIMES DAILY
Status: DISCONTINUED | OUTPATIENT
Start: 2025-03-10 | End: 2025-03-10 | Stop reason: HOSPADM

## 2025-03-10 RX ADMIN — FENTANYL CITRATE 50 MCG: 50 INJECTION INTRAMUSCULAR; INTRAVENOUS at 10:43

## 2025-03-10 RX ADMIN — SODIUM CHLORIDE, SODIUM LACTATE, POTASSIUM CHLORIDE, AND CALCIUM CHLORIDE: .6; .31; .03; .02 INJECTION, SOLUTION INTRAVENOUS at 08:30

## 2025-03-10 RX ADMIN — BUPIVACAINE HYDROCHLORIDE IN DEXTROSE 1.6 ML: 7.5 INJECTION, SOLUTION SUBARACHNOID at 07:39

## 2025-03-10 RX ADMIN — FENTANYL CITRATE 50 MCG: 50 INJECTION INTRAMUSCULAR; INTRAVENOUS at 10:38

## 2025-03-10 RX ADMIN — LABETALOL 20 MG/4 ML (5 MG/ML) INTRAVENOUS SYRINGE 10 MG: at 08:35

## 2025-03-10 RX ADMIN — SODIUM CHLORIDE, SODIUM LACTATE, POTASSIUM CHLORIDE, AND CALCIUM CHLORIDE 125 ML/HR: .6; .31; .03; .02 INJECTION, SOLUTION INTRAVENOUS at 07:02

## 2025-03-10 RX ADMIN — ACETAMINOPHEN 975 MG: 325 TABLET, FILM COATED ORAL at 06:25

## 2025-03-10 RX ADMIN — CEFAZOLIN 1000 MG: 1 INJECTION, POWDER, FOR SOLUTION INTRAVENOUS at 07:35

## 2025-03-10 RX ADMIN — PROPOFOL 130 MCG/KG/MIN: 10 INJECTION, EMULSION INTRAVENOUS at 07:41

## 2025-03-10 RX ADMIN — OXYCODONE HYDROCHLORIDE 5 MG: 5 TABLET ORAL at 11:44

## 2025-03-10 RX ADMIN — MIDAZOLAM 2 MG: 1 INJECTION INTRAMUSCULAR; INTRAVENOUS at 07:29

## 2025-03-10 RX ADMIN — ONDANSETRON 4 MG: 2 INJECTION INTRAMUSCULAR; INTRAVENOUS at 08:14

## 2025-03-10 RX ADMIN — HYDROMORPHONE HYDROCHLORIDE 0.5 MG: 1 INJECTION, SOLUTION INTRAMUSCULAR; INTRAVENOUS; SUBCUTANEOUS at 09:58

## 2025-03-10 RX ADMIN — SODIUM CHLORIDE, SODIUM LACTATE, POTASSIUM CHLORIDE, AND CALCIUM CHLORIDE: .6; .31; .03; .02 INJECTION, SOLUTION INTRAVENOUS at 07:30

## 2025-03-10 RX ADMIN — DEXAMETHASONE SODIUM PHOSPHATE 10 MG: 10 INJECTION, SOLUTION INTRAMUSCULAR; INTRAVENOUS at 08:14

## 2025-03-10 RX ADMIN — CEFAZOLIN SODIUM 2000 MG: 2 SOLUTION INTRAVENOUS at 07:34

## 2025-03-10 RX ADMIN — CHLORHEXIDINE GLUCONATE 15 ML: 1.2 SOLUTION ORAL at 06:27

## 2025-03-10 RX ADMIN — PROPOFOL 50 MG: 10 INJECTION, EMULSION INTRAVENOUS at 07:42

## 2025-03-10 RX ADMIN — TRANEXAMIC ACID 1000 MG: 10 INJECTION, SOLUTION INTRAVENOUS at 07:41

## 2025-03-10 RX ADMIN — HYDROMORPHONE HYDROCHLORIDE 0.5 MG: 1 INJECTION, SOLUTION INTRAMUSCULAR; INTRAVENOUS; SUBCUTANEOUS at 09:45

## 2025-03-10 RX ADMIN — PROPOFOL 20 MG: 10 INJECTION, EMULSION INTRAVENOUS at 07:44

## 2025-03-10 NOTE — OCCUPATIONAL THERAPY NOTE
"    Occupational Therapy Evaluation     Patient Name: Brown Britt  Today's Date: 3/10/2025  Problem List  Principal Problem:    Primary osteoarthritis of one hip, left    Past Medical History  Past Medical History:   Diagnosis Date    Hypercholesterolemia     Vitamin deficiency      Past Surgical History  Past Surgical History:   Procedure Laterality Date    BARIATRIC SURGERY  03/01/2021    gastric sleeve    CATARACT EXTRACTION, BILATERAL      HERNIA REPAIR               03/10/25 1334   OT Last Visit   OT Visit Date 03/10/25   Note Type   Note type Evaluation   Pain Assessment   Pain Assessment Tool 0-10   Pain Score 2   Pain Location/Orientation Orientation: Left;Location: Hip   Restrictions/Precautions   Weight Bearing Precautions Per Order Yes   LLE Weight Bearing Per Order WBAT   Other Precautions Fall Risk;Pain;WBS;THR   Home Living   Type of Home House   Home Layout Two level;Bed/bath upstairs;Stairs to enter with rails;Other (Comment)  (1 KIESHA, stairglide to second)   Bathroom Shower/Tub Tub/shower unit   Bathroom Toilet Raised   Bathroom Equipment Tub transfer bench   Bathroom Accessibility Accessible   Home Equipment Walker;Stair glide   Additional Comments no AD baseline   Prior Function   Level of [place] Independent with functional mobility;Independent with ADLs;Independent with IADLS   Lives With Other (Comment)  (mother)   Receives Help From Family   IADLs Independent with meal prep;Independent with medication management   Falls in the last 6 months 0   Vocational Unemployed   Subjective   Subjective \" my mom will help me\"   ADL   Where Assessed Edge of bed   Equipment Provided Reacher   LB Dressing Assistance 5  Supervision/Setup   LB Dressing Deficit Requires assistive device for steadying;Verbal cueing;Use of adaptive equipment  (reacher to don pants)   Bed Mobility   Rolling R 5  Supervision   Additional items HOB elevated;Verbal cues   Supine to Sit 5  Supervision   Additional items HOB " elevated;Verbal cues   Additional Comments pt oob in chair at end of session with RN present. pt on RA; SPO2>90%, no c/o of dizziness or SOB. supine /63, /66. upon standing, pt with significant drop of BP to 70 systolic. serial  BP taken with improvement, ending with 118 systolic   Transfers   Sit to Stand 5  Supervision   Additional items Increased time required;Verbal cues  (RW)   Stand to Sit 5  Supervision   Additional items Increased time required;Verbal cues  (RW)   Stand pivot 5  Supervision   Additional items Verbal cues  (RW)   Additional Comments RW utilized for functional transfers with overall (S), 1x posterior LOB, when taking 1x step backwards, pt able to regain balance with (A)   Functional Mobility   Functional Mobility 5  Supervision   Additional Comments pt completing ~125 ft functional mobility with RW, see PT note for stair negoitation; (S) with vc for BLE sequencing   Additional items Rolling walker   Balance   Static Sitting Good   Dynamic Sitting Good   Static Standing Fair +   Dynamic Standing Fair   Ambulatory Fair -   Activity Tolerance   Activity Tolerance Patient limited by fatigue;Patient limited by pain   Nurse Made Aware EDDIE CAT Assessment   RUE Assessment WFL   LUE Assessment   LUE Assessment WFL   Hand Function   Gross Motor Coordination Functional   Fine Motor Coordination Functional   Sensation   Light Touch No apparent deficits   Sharp/Dull No apparent deficits   Psychosocial   Psychosocial (WDL) WDL   Cognition   Overall Cognitive Status WFL   Arousal/Participation Responsive;Alert   Attention Within functional limits   Orientation Level Oriented X4   Memory Within functional limits   Following Commands Follows all commands and directions without difficulty   Assessment   Limitation Decreased ADL status;Decreased Safe judgement during ADL;Decreased UE strength;Decreased endurance;Decreased self-care trans;Decreased high-level ADLs   Assessment Pt is a 59 y.o.  male seen for OT evaluation s/p admit to [facility] on 3/10/2025 w/ Primary osteoarthritis of one hip, left.  Pt seen post op day 0 s/p L REINA WBAT. Comorbidities affecting pt's functional performance at time of assessment include:  hypotension, WBAT LLE . Personal factors affecting pt at time of IE include:steps to enter environment, difficulty performing ADLS, difficulty performing IADLS , decreased initiation and engagement , and health management . Prior to admission, pt was (I) with no AD. Upon evaluation: Pt requires (S) with RW 2* the following deficits impacting occupational performance: weakness, decreased strength, decreased balance, decreased tolerance, decreased safety awareness, orthopedic restrictions, and impaired interpersonal skills. Pt to benefit from continued skilled OT tx while in the hospital to address deficits as defined above and maximize level of functional independence w ADL's and functional mobility. Occupational Performance areas to address include: bathing/shower, toilet hygiene, dressing, functional mobility, community mobility, and clothing management. The patient's raw score on the -PAC Daily Activity Inpatient Short Form is 21. A raw score of greater than or equal to 19 suggests the patient may benefit from discharge to home. Discharge recommendation at this time is level III minimal resource intensity.  Pt benefited from co-evaluation of skilled OT and PT therapists in order to most appropriately address functional deficits d/t assistance required for safe functional mobility, decreased activity tolerance, and regression from functioning level prior to admission and/or onset of present illness. OT/PT objectives were addressed separately; please see PT note for specific goal areas targeted.   Goals   Patient Goals to go home   Short Term Goal  pt will complete UE strengthening exercises   Long Term Goal #1 pt will complete toilet transfers and hygiene (I)   Long Term Goal #2 pt will  complete LB dressing adhering to posterior hip precautions at  mod (I)   Long Term Goal pt will complete functional mobility at  mod (I) with RW   Plan   Treatment Interventions ADL retraining;Functional transfer training;UE strengthening/ROM;Equipment evaluation/education;Patient/family training;Activityengagement;Energy conservation   Goal Expiration Date 03/24/25   OT Frequency 3-5x/wk   Discharge Recommendation   Rehab Resource Intensity Level, OT III (Minimum Resource Intensity)   AM-PAC Daily Activity Inpatient   Lower Body Dressing 3   Bathing 3   Toileting 3   Upper Body Dressing 4   Grooming 4   Eating 4   Daily Activity Raw Score 21   Daily Activity Standardized Score (Calc for Raw Score >=11) 44.27   AM-PAC Applied Cognition Inpatient   Following a Speech/Presentation 4   Understanding Ordinary Conversation 4   Taking Medications 4   Remembering Where Things Are Placed or Put Away 4   Remembering List of 4-5 Errands 4   Taking Care of Complicated Tasks 3   Applied Cognition Raw Score 23   Applied Cognition Standardized Score 53.08

## 2025-03-10 NOTE — ANESTHESIA POSTPROCEDURE EVALUATION
Post-Op Assessment Note    CV Status:  Stable  Pain Score: 5    Pain management: inadequate       Mental Status:  Alert and awake   Hydration Status:  Euvolemic   PONV Controlled:  Controlled   Airway Patency:  Patent  Two or more mitigation strategies used for obstructive sleep apnea   Post Op Vitals Reviewed: Yes    No anethesia notable event occurred.    Staff: CRNA           Last Filed PACU Vitals:  Vitals Value Taken Time   Temp 98.8    Pulse 82 03/10/25 1021   /82 03/10/25 1016   Resp 13 03/10/25 1021   SpO2 99 % 03/10/25 1021   Vitals shown include unfiled device data.

## 2025-03-10 NOTE — OP NOTE
OPERATIVE REPORT    PATIENT NAME: Brown Britt   :  1965  MRN: [id number]  Pt Location: [facility] OR ROOM 01    SURGERY DATE: 3/10/2025    SURGEON(S) and ROLE:  Primary: Jack Rogers DO  Assisting: Piero Torres PA-C    NOTE:  The presence of a physician assistant was necessary to help with patient positioning, surgical exposure, wound retraction, wound closure, and other key portions of the procedure.  No qualified resident was available for this case.      PREOPERATIVE DIAGNOSES:  Left Hip Osteoarthritis    POSTOPERATIVE DIAGNOSES:  Same as Preoperative Diagnosis    PROCEDURES:  Left Posterior Total Hip Arthroplasty      ANESTHESIA TYPE:  Spinal    ANESTHESIA STAFF:   Anesthesiologist: Sanford Dudley MD  CRNA: Rubi Nichols CRNA    ESTIMATED BLOOD LOSS:  200 mL    PERIOPERATIVE ANTIBIOTICS:  cefazolin, 2 grams    IMPLANTS: Femoral Stem: Size 8 DEPUY ACTIS High-Offset    Femoral Neck:  +1.5 mm    Femoral Head: 40 mm ceramic head    Acetabular Shell: 58 mm OD DEPUY GRIPTION    Acetabular Screws: 2 x 6.5mm cancellous    Acetabular Liner: +4, 10 degree DEPUY ALTRX      Implant Name Type Inv. Item Serial No.  Lot No. LRB No. Used Action   COMPONENT ACETABULAR PINNACLE PRESS FIT 58MM GRIPTION - [id number]  COMPONENT ACETABULAR PINNACLE PRESS FIT 58MM GRIPTION  DEPUY [id number] Left 1 Implanted   SCREW KENIA 6.5 X 30MM SLF TAP PINNACLE - [id number]  SCREW KENIA 6.5 X 30MM SLF TAP PINNACLE  DEPUY [id number] Left 1 Implanted   SCREW KENIA 6.5 X 20MM SLF TAP PINNACLE - [id number]  SCREW KENIA 6.5 X 20MM SLF TAP PINNACLE  DEPUY [id number] Left 1 Implanted   LINER ACTB ULT LNK 40 X 58MM 10DEG +4 ALTRX - [id number]  LINER ACTB ULT LNK 40 X 58MM 10DEG +4 ALTRX  DEPUY [id number] Left 1 Implanted   STEM FEM SZ 8 TAPER CMNTLS HI COLLAR ACTIS - [id number]  STEM FEM SZ 8 TAPER CMNTLS HI COLLAR ACTIS  DEPUY [id number] Left 1 Implanted   HEAD FEMORAL  TPR 40MM +1.5MM BIOLOX DELTA TS ARTICULEZE - [id number]  HEAD FEMORAL  12/14 TPR 40MM +1.5MM BIOLOX DELTA TS ARTICULEZE  [employer] [id number] Left 1 Implanted       SPECIMENS:  * No specimens in log *    DRAINS:  None      OPERATIVE INDICATIONS:  The patient is a 59 y.o. male with left hip severe osteoarthritis.  Surgical treatment was indicated due to persistent symptoms despite non-surgical treatment and liklihood of prolonged or permanent functional impairment with non-surgical treatment.  After a thorough discussion of the potential risks, benefits, and alternative treatments, the patient agreed to proceed with surgery.  Patient has failed a physician directed physical therapy program and possible injections.  The patient understands that the risks of surgery include, but are not limited to: failure of repair, nonunion, malunion, loss of fixation, infection, neurovascular injury, wound healing complications, venous thromboembolism, persistent pain, stiffness, instability, recurrence of symptoms, potential need for additional surgeries, and loss of limb or life, dislocation, leg length discrepancy.  Oral and written consent for surgery was obtained from the patient preoperatively.    PROCEDURE AND TECHNIQUE:  On the day of surgery, the patient was identified in the preoperative holding area.  The operative site was marked by the surgeon.  The patient was taken into the operating room. The patient was anesthetized, and perioperative antibiotics, Two grams of Cefazolin were given..  The patient was placed on the right lateral decubitus position with the left hip up, an axillary roll was placed, hip positioner was used, all bony prominences were well-padded and  pelvis was well-padded as well. The operative site was prepped and draped using standard sterile technique.  Timeout was performed identifying all team members in the room and to confirm the patient's identity, the left hip as the operative site, and the proposed procedure.    A posterior approach to the hip was performed. An  approximately 10 cm incision was made over the lateral greater trochanter, and the incision was carried down to the fascia and hemostasis was obtained with electrocautery.  The fascia was incised in line with the incision.  The gluteus di was divided bluntly and crossing vessels were cauterized.  The trochanteric bursa was excised, and Charnley retractor was placed.  The femur was gently internally rotated.  The short external rotators as well as piriformis tendon were identified, released from the femur, and tagged with nonabsorbable suture, Ethibond, and resected from the greater trochanter.   T-shaped capsulotomy was made, both leaflets of the capsule were then tagged with suture as well.  The hip was gently dislocated.  The femoral head demonstrated severe degenerative changes.  A femoral neck osteotomy was made approximately 2 cm above the lesser trochanter with an oscillating saw and finished with an osteotome.    Attention was then drawn to the acetabulum,  and acetabular retractors were placed.  The labrum of the acetabulum was resected, slit was made just posterior to the LUCA  The acetabulum demonstrated  severe degenerative changes.  Retractors were placed,pulvinar tissue was resected.  Medializing reamer was then used, and the acetabulum was reamed incrementally to a size that created a stable hemispherical socket with good rim contact and subchondral bone bed, up to a size 58mm reamer, which had good fit.  A trial acetabular shell was impacted in 40 degrees of abduction and 30 degrees of anteversion with excelllent stability. It was deemed to have good tight fit with full coverage circumferentially. This was then removed, acetabulum was then copiously irrigated with normal saline, final 58mm Gription cup was then inserted maintaining the proper anteversion and abduction.  This was malleted into place and had good tight fit.  A trial liner was placed.  Large osteophytes in the anterior and inferior  portion of the acetabulum.  Half inch curved osteotome was used to resect multiple obstructive osteophytes.    Attention was then drawn to the proximal femur, and the leg was flexed and internally rotated. Femoral elevator was placed, standard approach to the proximal femur was then done using first the box osteotome, then the lateralizing reamer/canal finder.  A canal finder and box osteotome were used to access the femoral canal.  Femoral broaches were passed incrementally in about 10 degrees of anteversion. Broaching was performed up to size 8.  The final broach had a good axial and rotational stability, and it was left in place.  Calcar planar was then used.  A trial head and neck were placed.    Trial reductions were performed.  Neck length and liner offset were adjusted as needed to provde appropriate stability and soft tissue tension. +1.5 high offset neck was used with the trial. This was reduced into placed and was extremely stable all ranges of motion, Including 45 degrees of internal rotation 45 degrees of flexion with 90 degrees of flexion, position of sleep, 60 degrees of internal rotation.     There was no bony impingement, and soft tissue tension was appropriate.  Leg lengths were equal based on palpation of the knees and heels.    The hip was dislocated and the trial components were removed.  The acetabulum and femoral canal were once more copiously irrigated with sterile saline.  The final components were placed and found to have excellent fit and stability. The  final size 8 Actis stem was then inserted maintaining proper anteversion this was malleted into place.  Once again +1.5 trial was then used.  Deemed to be equally stable as previous, trial component was then removed. Final lateralized lip liner was placed within the acetabulum.  Final  Head was then engaged in the Ahuja taper.     The hip was reduced and found to have excellent stability, range of motion, and soft tissue tension, with  measurements identical to the trial reduction.    The hip was irrigated again with sterile saline. Patient was injected with total joint cocktail.   A The posterior capsular flap was closed with #2 Ethibond.  Piriformis short sternal rotators were then tagged with #2 Ethibond to the posterior capsule for reinforcement.  Iliotibial band was closed with #1 stratafix to achieve a watertight closure. Deep tissue closed with #1 vicryl. Sub Q closed with 2-0 monocryl. Skin closed with running 3-0 monocryl and skin glue. Skin cleansed and dried, mepilex dressing placed. Toes wrapped to groin with ACE and soft roll.     The drapes were removed, and a hip abduction pillow was placed.  The patient was awakened from anesthesia and taken to the PACU in stable condition.    COMPLICATIONS:  None    PATIENT DISPOSITION:  PACU  and hemodynamically stable    Plan:   WBAT to operative leg  Posterior hip precautions  PT/OT  ASA 81mg BID x 4 weeks  Keep mepilex dressing in place until follow up in 10-14 days  May readjust ACE wrap as needed  Multimodal pain control  Ancef x24 hours, duricef x 5 days    SIGNATURE:  Jack Rogers,   DATE:  March 10, 2025  TIME:  9:41 AM

## 2025-03-10 NOTE — ANESTHESIA PROCEDURE NOTES
Spinal Block    Patient location during procedure: OR  Start time: 3/10/2025 7:39 AM  Reason for block: procedure for pain and at surgeon's request  Staffing  Performed by: Rubi Nichols CRNA  Authorized by: Sanford Dudley MD    Preanesthetic Checklist  Completed: patient identified, IV checked, site marked, risks and benefits discussed, surgical consent, monitors and equipment checked, pre-op evaluation and timeout performed  Spinal Block  Patient position: sitting  Prep: ChloraPrep and site prepped and draped  Patient monitoring: frequent blood pressure checks, continuous pulse ox, heart rate and cardiac monitor  Approach: midline  Location: L3-4  Needle  Needle type: Pencan   Needle gauge: 24 G  Needle length: 4 in  Assessment  Sensory level: T4  Injection Assessment:  negative aspiration for heme, no paresthesia on injection and positive aspiration for clear CSF.  Post-procedure:  site cleaned

## 2025-03-10 NOTE — PHYSICAL THERAPY NOTE
"PHYSICAL THERAPY EVALUATION  NAME:  Brown Britt  DATE: 03/10/25    AGE:   59 y.o.  Mrn:   [id number]  ADMIT DX:  Primary osteoarthritis of one hip, left [M16.12]    Past Medical History:   Diagnosis Date    Hypercholesterolemia     Vitamin deficiency      Length Of Stay: 0  Performed at least 2 patient identifiers during session: Name and Epic photo  PHYSICAL THERAPY EVALUATION :    03/10/25 1305   PT Last Visit   PT Visit Date 03/10/25   Note Type   Note type Evaluation   Pain Assessment   Pain Assessment Tool 0-10   Pain Score 2   Pain Location/Orientation Orientation: Left;Location: Hip   Restrictions/Precautions   Weight Bearing Precautions Per Order Yes   LLE Weight Bearing Per Order WBAT   Other Precautions Pain;Fall Risk;Multiple lines;THR;WBS   Home Living   Type of Home House   Home Layout Two level;Bed/bath upstairs;Stairs to enter without rails  (1 KIESHA)   Bathroom Shower/Tub Tub/shower unit   Bathroom Toilet Raised   Bathroom Equipment Tub transfer bench   Bathroom Accessibility Accessible   Home Equipment Walker;Stair glide   Additional Comments pt denies use of DME at baseline   Prior Function   Level of [place] Independent with ADLs;Independent with functional mobility;Independent with IADLS   Lives With Family  (mother)   Receives Help From Family   IADLs Independent with driving   Falls in the last 6 months 0   Vocational Unemployed   General   Family/Caregiver Present Yes   Cognition   Overall Cognitive Status WFL   Arousal/Participation Alert   Orientation Level Oriented X4   Following Commands Follows all commands and directions without difficulty   Comments pt pleasant and cooperative   Subjective   Subjective \"No more than 90 degrees\"   RLE Assessment   RLE Assessment WFL   LLE Assessment   LLE Assessment X   Bed Mobility   Supine to Sit 5  Supervision   Additional items HOB elevated;Verbal cues   Sit to Supine   (pt OOB at end of session)   Transfers   Sit to Stand 5  Supervision "   Additional items Increased time required;Verbal cues   Stand to Sit 5  Supervision   Additional items Increased time required;Verbal cues   Additional Comments RW used; BP decreasing significantly to 70's systolic upon standing. Pt remaining asymptomatic. While seated, serial BPs taken and elevated each time. Final BP systolic 118 and RN comfortable with mobilizing pt   Ambulation/Elevation   Gait pattern Step to;Antalgic   Gait Assistance 5  Supervision   Additional items Verbal cues   Assistive Device Rolling walker   Distance 125'   Stair Management Assistance 5  Supervision   Additional items Verbal cues   Stair Management Technique Two rails;Step to pattern;Foreward;Backward   Number of Stairs 2   Ambulation/Elevation Additional Comments VCs for sequencing and RW use   Balance   Static Sitting Good   Dynamic Sitting Good   Static Standing Fair +   Dynamic Standing Fair   Ambulatory Fair -  (with RW)   Endurance Deficit   Endurance Deficit Yes   Endurance Deficit Description pt appears fatigued with increased ambulation   Activity Tolerance   Activity Tolerance Patient limited by fatigue;Patient limited by pain   Nurse Made Aware RN Sommer   Assessment   Prognosis Good   Problem List Decreased strength;Decreased endurance;Impaired balance;Decreased mobility;Orthopedic restrictions;Pain   Goals   Patient Goals to go home   LTG Expiration Date 03/24/25   Plan   Treatment/Interventions Functional transfer training;LE strengthening/ROM;Elevations;Therapeutic exercise;Endurance training;Bed mobility;Gait training   PT Frequency 3-5x/wk   Discharge Recommendation   Rehab Resource Intensity Level, PT III (Minimum Resource Intensity)   AM-PAC Basic Mobility Inpatient   Turning in Flat Bed Without Bedrails 4   Lying on Back to Sitting on Edge of Flat Bed Without Bedrails 4   Moving Bed to Chair 3   Standing Up From Chair Using Arms 3   Walk in Room 3   Climb 3-5 Stairs With Railing 3   Basic Mobility Inpatient Raw Score  20   Basic Mobility Standardized Score 43.99   [facility] Highest Level Of Mobility   -HL Goal 6: Walk 10 steps or more   -HLM Achieved 7: Walk 25 feet or more   End of Consult   Patient Position at End of Consult Bedside chair;All needs within reach       Pt requires PT/OT co-eval due to medical complexity, safety concerns, fall risk, significant assistance with mobility and/or cognitive-behavioral impairments.    (Please find full objective findings from PT assessment regarding body systems outlined above).     Assessment: Pt is a 59 y.o. male seen for PT evaluation s/p admission to [facility] on 3/10/2025 with Primary osteoarthritis of one hip, left.  Order placed for PT services.  Upon evaluation: Pt is presenting with impaired functional mobility due to pain, decreased strength, decreased endurance, impaired balance, gait deviations, and fall risk requiring  SUP assistance for bed mobility, transfers, and ambulation with RW . Pt's clinical presentation is currently evolving given the functional mobility deficits above, especially weakness, decreased endurance, impaired balance, gait deviations, pain, and decreased activity tolerance, and combined with medical complications of hypotension, pain impacting overall mobility status, and need for input for mobility technique/safety.  Pt's PMHx and comorbidities that may affect physical performance and progress include:  HTN, s/p gastric sleeve procedure, need for R hip replacement . Personal factors affecting pt at time of IE include: step(s) to enter environment, multi-level environment, inability to perform IADLs, and inability to navigate community distances. Pt will benefit from continued skilled PT services to address deficits as defined above and to maximize level of functional mobility to facilitate return toward PLOF and improved QOL. From PT/mobility standpoint, recommendation at time of d/c would be Level III (Minimum Resource  Intensity) in order to reduce fall risk and maximize pt's functional independence and consistency with mobility.      The patient's AM-PAC Basic Mobility Inpatient Short Form Raw Score is 20. A Raw score of greater than 16 suggests the patient may benefit from discharge to home. Please also refer to the recommendation of the Physical Therapist for safe discharge planning.       Goals: Pt will participate in B LE strengthening exercises to facilitate improved functional activity tolerance. Pt will perform all functional transfers and bed mobility mod(I) with good safety awareness. Pt will ambulate 250' mod(I) with LRAD while maintaining good functional dynamic balance. Pt will ascend/descend a FFOS with HR and SUP to reflect the ability to safely navigate the home.     Manjula Peraza, PT,DPT

## 2025-03-10 NOTE — PLAN OF CARE
Problem: PHYSICAL THERAPY ADULT  Goal: Performs mobility at highest level of function for planned discharge setting.  See evaluation for individualized goals.  Description: Treatment/Interventions: Functional transfer training, LE strengthening/ROM, Elevations, Therapeutic exercise, Endurance training, Bed mobility, Gait training          See flowsheet documentation for full assessment, interventions and recommendations.  Note: Prognosis: Good  Problem List: Decreased strength, Decreased endurance, Impaired balance, Decreased mobility, Orthopedic restrictions, Pain      Assessment: Pt is a 59 y.o. male seen for PT evaluation s/p admission to [facility] on 3/10/2025 with Primary osteoarthritis of one hip, left.  Order placed for PT services.  Upon evaluation: Pt is presenting with impaired functional mobility due to pain, decreased strength, decreased endurance, impaired balance, gait deviations, and fall risk requiring  SUP assistance for bed mobility, transfers, and ambulation with RW . Pt's clinical presentation is currently evolving given the functional mobility deficits above, especially weakness, decreased endurance, impaired balance, gait deviations, pain, and decreased activity tolerance, and combined with medical complications of hypotension, pain impacting overall mobility status, and need for input for mobility technique/safety.  Pt's PMHx and comorbidities that may affect physical performance and progress include:  HTN, s/p gastric sleeve procedure, need for R hip replacement . Personal factors affecting pt at time of IE include: step(s) to enter environment, multi-level environment, inability to perform IADLs, and inability to navigate community distances. Pt will benefit from continued skilled PT services to address deficits as defined above and to maximize level of functional mobility to facilitate return toward PLOF and improved QOL. From PT/mobility standpoint, recommendation at  time of d/c would be Level III (Minimum Resource Intensity) in order to reduce fall risk and maximize pt's functional independence and consistency with mobility.      Rehab Resource Intensity Level, PT: III (Minimum Resource Intensity)    See flowsheet documentation for full assessment.

## 2025-03-10 NOTE — OCCUPATIONAL THERAPY NOTE
"  Occupational Therapy Progress Note     Patient Name: Brown Britt  Today's Date: 3/10/2025  Problem List  Principal Problem:    Primary osteoarthritis of one hip, left              03/10/25 3025   OT Last Visit   OT Visit Date 03/10/25   Pain Assessment   Pain Assessment Tool 0-10   Pain Score 2   Pain Location/Orientation Orientation: Left;Location: Hip   Restrictions/Precautions   Weight Bearing Precautions Per Order Yes   LLE Weight Bearing Per Order WBAT   Other Precautions Pain;Fall Risk;WBS;THR   ADL   LB Dressing Assistance 5  Supervision/Setup   LB Dressing Deficit Use of adaptive equipment;Other (Comment);Thread LLE into pants;Thread RLE into pants   LB Dressing Comments pt educated on posterior hip precautions with LB dressing. visual demonstration and verbal directions on use of AE ie. sock aide, long handled sponge, and reacher to complete dressing tasks. pt educated where to purchase for use. pt stating no need for AE; stating mom will complete for him if needed   Subjective   Subjective \"my mom will help me\"   Cognition   Overall Cognitive Status WFL   Arousal/Participation Alert;Responsive   Attention Within functional limits   Orientation Level Oriented X4   Memory Within functional limits   Following Commands Follows all commands and directions without difficulty   Assessment   Assessment Patient participated in Skilled OT session this date with interventions consisting of ADL re training with the use of correct body mechnaics,  long handle equipment, maintaining Total Hip precautions, and maintaining weight bearing restrictions. Patient agreeable to OT treatment session, upon arrival patient was found supine in bed. Patient requiring frequent re direction, verbal cues for correct technique, and ocassional safety reminders. Patient continues to be functioning below baseline level, occupational performance remains limited secondary to factors listed above and increased risk for falls and injury. " The patient's raw score on the AM-PAC Daily Activity Inpatient Short Form is 21. A raw score of greater than or equal to 19 suggests the patient may benefit from discharge to home. Please refer to the recommendation of the Occupational Therapist for safe discharge planning. From OT standpoint, recommendation at time of d/c would be level III minimal resource intensity   Plan   Goal Expiration Date 03/24/25   OT Treatment Day 1   OT Frequency 3-5x/wk   Discharge Recommendation   Rehab Resource Intensity Level, OT III (Minimum Resource Intensity)   AM-PAC Daily Activity Inpatient   Lower Body Dressing 3   Bathing 3   Toileting 3   Upper Body Dressing 4   Grooming 4   Eating 4   Daily Activity Raw Score 21   Daily Activity Standardized Score (Calc for Raw Score >=11) 44.27   AM-PAC Applied Cognition Inpatient   Following a Speech/Presentation 4   Understanding Ordinary Conversation 4   Taking Medications 4   Remembering Where Things Are Placed or Put Away 4   Remembering List of 4-5 Errands 4   Taking Care of Complicated Tasks 3   Applied Cognition Raw Score 23   Applied Cognition Standardized Score 53.08

## 2025-03-10 NOTE — DISCHARGE INSTR - AVS FIRST PAGE
Hip Replacement Discharge Instructions  Dr. Jack Rogers DO, Orthopedic Surgeon  [facility]   Phone:[phone] Fax:[phone]      What to Expect/Activity  You are weight bearing as tolerated to your operative leg unless otherwise instructed. Use your walker or crutches. It is important to get up and walk for 10 minutes every hour, if possible.  Initial recovery therapy is focused on walking. If in your follow-up a referral to formal physical therapy is required, this will be provided based on your recovery.  You may sleep with the pink pillow between your legs, and discuss comfort with physical therapy  Swelling and discomfort causes pain. Elevate your surgical leg on 1-2 pillows placed behind your ankle/calf throughout the day, especially when you are laying down.  Please use ice packs for 20-30 minutes every 1-2 hours for 48-72 hours on the operative hip. Please make sure there is a barrier directly between your skin and your ice/ice pack.  Please use incentive spirometer 10 times per hour while awake (see diagram below).  Maintain Posterior Hip Precautions at all times:    Be sure to maintain Hip Precautions (no bending at waist, no crossing legs, or internally rotating surgical leg) at all times!         Dressing/Wound Care/Bathing  You may start showering 24 hours after surgery, the surgical dressing (silver sticky dressing) will remain in place.   Use a bag or wrap to keep the silver dressing dry  You will be wrapped in soft fluffy dressing and ACE wrap over the sticky silver dressing; the ACE wrap can be readjusted as needed, and the fluffy dressing removed if uncomfortable  Do not place any creams, ointments or gels on or around the incision.  No baths, swimming or submerging until cleared     Pain Management/Medications  You may resume your usual medications.  Please take the following medications:  Anti-coagulation (blood clot prevention) - Aspirin 81mg twice daily  for 4 weeks   Pain medication:  Narcotic Medication: Take as directed  NSAID (Anti-inflammatory): Take as directed  Tylenol 1000mg every 8 hours  Zofran (ondasetron) - 4mg every 8 hours as needed for nausea  Stool Softeners (senna/colace)- take daily to help prevent constipation as narcotic pain medication causes constipation  Antibiotic - take as directed if prescribed  If you have questions or pain concerns, please contact the office. Pain medication cannot remove all post-operative pain.    Follow up/Call if:  The findings of your surgery will be explained to you and your family immediately after surgery. However, in the post-operative period, during recovery from anesthesia you may not fully remember or fully understand what was said. We will go over this again when you return for your post-op appointment.  Please contact the office if you experience the following:  Excessive bleeding (bleeding through your dressing)  Fever greater than 101 degrees F after the first 2 days (a slight fever is normal the first few days after surgery)  Persistent nausea or vomiting  Decreased sensation or discoloration of the operative limb  Pain or swelling that is getting worse and not better with medication    Office Contact: [phone]     \

## 2025-03-10 NOTE — ANESTHESIA PREPROCEDURE EVALUATION
Procedure:  ARTHROPLASTY HIP TOTAL (Left: Hip)    Relevant Problems   CARDIO   (+) Essential hypertension   (+) Mixed hyperlipidemia      MUSCULOSKELETAL   (+) Arthritis   (+) Osteoarthritis of both hips      Past Medical History:   Diagnosis Date    Hypercholesterolemia     Vitamin deficiency      Lab Results   Component Value Date    WBC 8.15 02/20/2025    HGB 16.1 02/20/2025    HCT 46.7 02/20/2025    MCV 88 02/20/2025     02/20/2025       Physical Exam    Airway    Mallampati score: III  TM Distance: >3 FB  Neck ROM: full     Dental   No notable dental hx     Cardiovascular  Rhythm: regular, Rate: normal    Pulmonary   Breath sounds clear to auscultation    Other Findings  Intercisor Distance > 3cm          Anesthesia Plan  ASA Score- 2     Anesthesia Type- spinal with ASA Monitors.         Additional Monitors:     Airway Plan:     Comment: Discussed benefits/risks of neuraxial anesthesia including possibility of discomfort at site of injection, post-dural puncture headache, block failure, and more rare complications such as bleeding, infection, and permanent nerve damage. If block fails or there is difficulty placing neuraxial block, general anesthesia was discussed as back-up plan. Patient understands and wishes to proceed. All questions answered.   .       Plan Factors-Exercise tolerance (METS): >4 METS.    Chart reviewed. EKG reviewed.  Existing labs reviewed.                   Induction-     Postoperative Plan- Plan for postoperative opioid use.         Informed Consent- Anesthetic plan and risks discussed with patient.  I personally reviewed this patient with the CRNA. Discussed and agreed on the Anesthesia Plan with the CRNA..      NPO Status:  Vitals Value Taken Time   Date of last liquid 03/10/25 03/10/25 0638   Time of last liquid 0500 03/10/25 0638   Date of last solid 03/09/25 03/10/25 0638   Time of last solid 1900 03/10/25 0638

## 2025-03-10 NOTE — INTERVAL H&P NOTE
H&P reviewed. After examining the patient I find no changes in the patients condition since the H&P had been written. Patient has been cleared by the medical team and is keen for surgery for left total hip arthroplasty today.     Vitals:    03/10/25 0637   BP: 153/94   Pulse: 87   Resp: 20   Temp: 97.6 °F (36.4 °C)   SpO2: 94%

## 2025-03-11 ENCOUNTER — TELEPHONE (OUTPATIENT)
Dept: OBGYN CLINIC | Facility: HOSPITAL | Age: 60
End: 2025-03-11

## 2025-03-11 RX ORDER — PNV,CALCIUM 72/IRON/FOLIC ACID 27 MG-1 MG
1 TABLET ORAL DAILY
Qty: 30 TABLET | Refills: 1 | Status: SHIPPED | OUTPATIENT
Start: 2025-03-11

## 2025-03-11 RX ORDER — ASCORBIC ACID 500 MG
500 TABLET ORAL 2 TIMES DAILY
Qty: 60 TABLET | Refills: 1 | Status: SHIPPED | OUTPATIENT
Start: 2025-03-11

## 2025-03-11 RX ORDER — FOLIC ACID 1 MG/1
1 TABLET ORAL DAILY
Qty: 30 TABLET | Refills: 1 | Status: SHIPPED | OUTPATIENT
Start: 2025-03-11

## 2025-03-11 NOTE — TELEPHONE ENCOUNTER
"Patient contacted for a postoperative follow up assessment. Patient states current pain level of a  \"not too bad\"  and is walking with RW.  Patient denies increase in swelling and dressing is Dressing C/D/I. Patient is icing the site regularly. NN educated patient on post-op swelling, icing, and constipation.    Confirmed upcoming appointments w/ patient:   PT 3/12  Postop 3/18     We reviewed patients AVS medication list. Patient is taking Oxycodone 5mg PRN, ASA 81mg BID, Senokot once daily, and Duricef 500mg every 12 hours x5 days, Zofran 4mg PRN, and Celebrex 100mg BID. Patient is taking Tylenol 1000mg PRN, discussed taking scheduled as prescribed every 8 hours, pt verbalizes understanding. Patient has not had a BM but is taking prescribed Senokot. Discussed postop constipation.      Patient denies nausea, vomiting, abdominal pain, chest pain, shortness of breath, fever, dizziness, and calf pain. Patient does not have any other questions or concerns at this time. Pt was encouraged to call with any questions, concerns or issues.     "

## 2025-03-12 ENCOUNTER — OFFICE VISIT (OUTPATIENT)
Dept: PHYSICAL THERAPY | Facility: CLINIC | Age: 60
End: 2025-03-12
Payer: COMMERCIAL

## 2025-03-12 DIAGNOSIS — M16.12 PRIMARY OSTEOARTHRITIS OF ONE HIP, LEFT: Primary | ICD-10-CM

## 2025-03-12 DIAGNOSIS — Z96.642 STATUS POST TOTAL REPLACEMENT OF LEFT HIP: ICD-10-CM

## 2025-03-12 PROCEDURE — 97164 PT RE-EVAL EST PLAN CARE: CPT | Performed by: PHYSICAL THERAPIST

## 2025-03-12 PROCEDURE — 97110 THERAPEUTIC EXERCISES: CPT | Performed by: PHYSICAL THERAPIST

## 2025-03-12 PROCEDURE — 97116 GAIT TRAINING THERAPY: CPT | Performed by: PHYSICAL THERAPIST

## 2025-03-12 NOTE — PROGRESS NOTES
PT Re-Evaluation     Today's date: 3/12/2025  Patient name: Brown Britt  : 1965  MRN: [id number]  Referring provider: Jack Rogers DO  Dx:   Encounter Diagnosis     ICD-10-CM    1. Primary osteoarthritis of one hip, left  M16.12       2. Status post total replacement of left hip  Z96.642             Start Time: 1200  Stop Time: 1245  Total time in clinic (min): 45 minutes    Assessment  Impairments: abnormal or restricted ROM, impaired physical strength, lacks appropriate home exercise program and pain with function    Assessment details: The patient is s/p L THR on 3-10-25 by Dr. Rogers. He was discharged home the same day. He reports pain 4/10 at worst. He is using ice and pain meds as directed. He is ambulating with a RW with a grossly normal gait pattern.  He is able to verbalized hip precautions. Deficits are noted in L hip ROM and strength. He would benefit from continued physical therapy to address deficits and to improve functional mobility.   Understanding of Dx/Px/POC: good     Prognosis: good    Goals  STG(6 weeks):   ONGOING            1. Independent with HEP            2. Decrease pain to 4/10 at best with functional activities            3. Increase AROM L hip to WFL            4. Increase strength LLE by 1/2 MMT grade            5. Patient will ambulate household distances with least restrictive AD     LTG(12 weeks):  ONGOING             1. Decrease pain to 2/10 at worst with functional activities             2. Increase LLE strength to 4+ to 5/5             3.Patient will ambulate community distances with least restrictive AD             4. Return to PLOF       Plan  Patient would benefit from: skilled physical therapy  Planned modality interventions: cryotherapy    Planned therapy interventions: abdominal trunk stabilization, balance, manual therapy, neuromuscular re-education, patient/caregiver education, gait training, strengthening, stretching, therapeutic activities, therapeutic  exercise and home exercise program    Frequency: 2-3x week  Duration in weeks: 12  Plan of Care beginning date: 2/25/2025  Plan of Care expiration date: 5/20/2025  Treatment plan discussed with: patient        Subjective Evaluation    History of Present Illness  Mechanism of injury: The patient reports a long history of L hip pain. He started to notice a limp and he was having a hard time getting comfortable to sleep. Xrays revealed significant OA. He is scheduled for a L THR on 3-10-25 by Dr. Rogers. He will be seen today for a pre-operative visit. The patient has a stair glide to his second floor and is installing a higher toilet this weekend. He has a RW at home.     UPDATE 3/12/25:  The patient is s/p L THR on 3-10-25. He was discharged home the same day. The patient reports 1-2/10 at rest and 3-4/10 when walking.  He has been using ice and pain meds as directed. RTD 3-18-25.  Patient Goals  Patient goal: To have normal ROM, be able to get my socks on easier  Social Support  Steps to enter house: yes  Stairs in house: yes   Lives in: multiple-level home  Lives with: parents    Employment status: not working        Objective     Active Range of Motion   Left Hip   Flexion: 85 degrees   Abduction: 18 degrees     Strength/Myotome Testing     Left Hip   Planes of Motion   Flexion: 3-  Abduction: 3    Right Hip   Planes of Motion   Flexion: 4+  Extension: 4+  Abduction: 4+    Left Knee   Flexion: 4+  Extension: 5    Tests     Left Hip   Positive AWAIS.     Additional Tests Details  Flexibility   Hamstring  L  Poor, R Fair                    Piriformis   L Poor, R Poor    Ambulation     Ambulation: Level Surfaces   Ambulation with assistive device: independent (RW)             Precautions: L THR 3-10-25  HEP issued         2/25 3/12           Manuals                          Assessment  JM                        Neuro Re-Ed                                                                                                  "       Ther Ex             SKTC 10\"x10 w/ towel            Piri stretch 10\"x10 w/ towel            SLR 10x            Iso hip add 5\"x10 in HL            HS stretch 30\"x3 on 8\" step            LAQ  5\"x10            Heel slide  10x           Sliding abd  15x           SAQ  5\"x15           SLR  5x AAROM           Standing hip abd,ext,march             NuStep for hip mobility and ms endurance             Ther Activity             STS  10x from plinth           Step ups             Gait Training                                       Modalities             CP                               "

## 2025-03-13 ENCOUNTER — TELEPHONE (OUTPATIENT)
Age: 60
End: 2025-03-13

## 2025-03-13 DIAGNOSIS — K59.00 CONSTIPATION, UNSPECIFIED CONSTIPATION TYPE: Primary | ICD-10-CM

## 2025-03-13 RX ORDER — POLYETHYLENE GLYCOL 3350 17 G/17G
17 POWDER, FOR SOLUTION ORAL DAILY
Qty: 85 G | Refills: 0 | Status: SHIPPED | OUTPATIENT
Start: 2025-03-13 | End: 2025-03-24

## 2025-03-13 NOTE — TELEPHONE ENCOUNTER
Caller: Brown    Call back#: [phone]    Best call back time am/pm/all day: all day    Doctor: Dr Rogers    Surgery: yes    Date of Surgery: 3/10/25    Reason for call: Patient has not had a bowl movement since the SX. He is taking the stool softener but it is not helping. Is there something else that can help  Please advise  Thank you       Urgent matters - Please Teams message Nurse Navigator Team Chat & send phone note to Orthopedic Nurse Navigator Pool as high priority before transferring call.   Non-Urgent matters - Please send a phone note to Orthopedic Nurse Navigator Pool only. Nurse Navigators will call patients back asap.

## 2025-03-13 NOTE — TELEPHONE ENCOUNTER
Caller: Brown    Call back#: [phone]    Best call back time am/pm/all day: all day    Doctor: Dr frances    Surgery: yes    Date of Surgery: 3/11/25    Reason for call: Patient has developed a rash uploading picture      Urgent matters - Please Teams message Nurse Navigator Team Chat & send phone note to Orthopedic Nurse Navigator Pool as high priority before transferring call.   Non-Urgent matters - Please send a phone note to Orthopedic Nurse Navigator Pool only. Nurse Navigators will call patients back asap.

## 2025-03-13 NOTE — TELEPHONE ENCOUNTER
"Patient stated to start with rash as of this morning in the groin crease of surgical leg. Patient denies burning, itching or any other symptoms and states \"I just noticed it when I took a shower.\" I informed patient to try to keep the area clean and dry in the interim and will forward to surgeon for review. Patient uploaded image for review.   "

## 2025-03-14 NOTE — TELEPHONE ENCOUNTER
Caller: Patient    Doctor: Dr. Rogers    Reason for call: Patient states he didn't hear anything back from his   Regarding rash on his groan // advised the patient I do see the Nurse spoke to him yesterday to try to keep the area clean and dry in the interim and will forward to surgeon for review   // Patient is looking for call  //Please advise    Call back#: [phone]

## 2025-03-17 ENCOUNTER — OFFICE VISIT (OUTPATIENT)
Dept: PHYSICAL THERAPY | Facility: CLINIC | Age: 60
End: 2025-03-17
Payer: COMMERCIAL

## 2025-03-17 DIAGNOSIS — Z96.642 STATUS POST TOTAL REPLACEMENT OF LEFT HIP: ICD-10-CM

## 2025-03-17 DIAGNOSIS — M25.552 LEFT HIP PAIN: ICD-10-CM

## 2025-03-17 DIAGNOSIS — M16.12 PRIMARY OSTEOARTHRITIS OF ONE HIP, LEFT: Primary | ICD-10-CM

## 2025-03-17 PROCEDURE — 97110 THERAPEUTIC EXERCISES: CPT | Performed by: PHYSICAL THERAPIST

## 2025-03-17 NOTE — PROGRESS NOTES
"Daily Note     Today's date: 3/17/2025  Patient name: Brown Britt  : 1965  MRN: [id number]  Referring provider: Jack Rogers DO  Dx:   Encounter Diagnosis     ICD-10-CM    1. Primary osteoarthritis of one hip, left  M16.12       2. Status post total replacement of left hip  Z96.642       3. Left hip pain  M25.552           Start Time: 1345  Stop Time: 1430  Total time in clinic (min): 45 minutes    Subjective: The patient came into clinic walking without an AD. He states that he walked up his steps at home reciprocally. He follows up with the surgeon tomorrow.       Objective: See treatment diary below      Assessment: Patient able to perform all closed chain exercises bilaterally without difficulty. He is walking well without an AD. Tolerated treatment well. Patient would benefit from continued PT      Plan: Continue per plan of care.      Precautions: L THR 3-10-25  HEP issued         2/25 3/12 3/17          Manuals                          Assessment  JM                        Neuro Re-Ed             SLS                                                                                           Ther Ex             SKTC 10\"x10 w/ towel            Piri stretch 10\"x10 w/ towel            SLR 10x            Iso hip add 5\"x10 in HL  5\"x20 in HL          HS stretch 30\"x3 on 8\" step  30\"x3 on 8\" step          LAQ  5\"x10  5\"x20          Heel slide  10x D/C          Sliding abd  15x D/C          SAQ  5\"x15 D/C          SLR  5x AAROM 10x AROM          Bridges   5\"x20          Standing hip abd,ext,march   2x10 ea BLE          NuStep for hip mobility and ms endurance   L5 10'          Ther Activity             STS  10x from plinth 2x10  chair+AE          Step ups             Gait Training                                       Modalities             CP                               "

## 2025-03-18 ENCOUNTER — TELEPHONE (OUTPATIENT)
Age: 60
End: 2025-03-18

## 2025-03-18 ENCOUNTER — OFFICE VISIT (OUTPATIENT)
Dept: OBGYN CLINIC | Facility: CLINIC | Age: 60
End: 2025-03-18

## 2025-03-18 VITALS
BODY MASS INDEX: 39.08 KG/M2 | TEMPERATURE: 98.1 F | HEART RATE: 107 BPM | WEIGHT: 273 LBS | HEIGHT: 70 IN | RESPIRATION RATE: 16 BRPM | OXYGEN SATURATION: 94 %

## 2025-03-18 DIAGNOSIS — M16.12 PRIMARY OSTEOARTHRITIS OF ONE HIP, LEFT: Primary | ICD-10-CM

## 2025-03-18 PROCEDURE — 99024 POSTOP FOLLOW-UP VISIT: CPT | Performed by: STUDENT IN AN ORGANIZED HEALTH CARE EDUCATION/TRAINING PROGRAM

## 2025-03-18 NOTE — TELEPHONE ENCOUNTER
Caller: Patient    Call back#: [phone]    Best call back time am/pm/all day: all day    Doctor: Dr Rogers    Surgery: yes    Date of Surgery: 3/10/25    Reason for call: Questioned when he would be able to take a bath?      Urgent matters - Please Teams message Nurse Navigator Team Chat & send phone note to Orthopedic Nurse Navigator Pool as high priority before transferring call.   Non-Urgent matters - Please send a phone note to Orthopedic Nurse Navigator Pool only. Nurse Navigators will call patients back asap.

## 2025-03-18 NOTE — PROGRESS NOTES
Post-Operative Note: Total Hip Arthroplasty  Brown Britt (59 y.o. male)  : 1965 Encounter Date: 3/18/2025  Dr. Jack Rogers DO, Orthopedic Surgeon  [facility]       59 y.o. male 8 day follow up status post left posterior total hip arthroplasty     :  Assessment & Plan  Primary osteoarthritis of one hip, left  Wound Care   Continue current care., OK to shower., No soaking or bathing for another 2 weeks.  Pain control: PRN  Continue ice packs/elevation  Can continue compression   Continue with physical therapy  Continue activities as tolerated  Maintain posterior hip precautions  Complete DVT ppx ASA BID x 4 weeks   Follow up in 5 weeks   for evaluation WITH  x-ray     Patient is doing extremely well overall.  He has minimal pain he is not walking with any assistive device at all.  He is posting videos of his progress on the Internet.  He is already excited and hoping to plan for his contralateral total hip.    Subjective:  59 y.o. male  presenting to the office for 1 week follow up status post left posterior total hip arthroplasty (elective)  DOS: 3/10/2025    Current concerns: none  Patient is controlling pain with medications as rx  Numbness/weakness extremity: None Reported   Physical Therapy Progress: Progressing well  DVT ppx: ASA 81mg BID x 4 weeks  Abx: Completed 5 days of duricef  Eating/Drinking improving. Bowel/Bladder: WNL  Patient denies symptoms of an infection.     Physical Exam:   Exam: left hip  Incision: healing well no significant drainage no dehiscence no significant erythema  ROM: WNL  Sensation: normal   Brisk Capillary Refill    Imaging:  reviewed postop imaging    Piero CARIAS PA-C, scribed this note  for Dr. Jack Rogers DO, who performed the services as described in this documentation

## 2025-03-20 ENCOUNTER — OFFICE VISIT (OUTPATIENT)
Dept: PHYSICAL THERAPY | Facility: CLINIC | Age: 60
End: 2025-03-20
Payer: COMMERCIAL

## 2025-03-20 ENCOUNTER — TELEPHONE (OUTPATIENT)
Age: 60
End: 2025-03-20

## 2025-03-20 DIAGNOSIS — Z96.642 STATUS POST TOTAL REPLACEMENT OF LEFT HIP: ICD-10-CM

## 2025-03-20 DIAGNOSIS — M16.12 PRIMARY OSTEOARTHRITIS OF ONE HIP, LEFT: Primary | ICD-10-CM

## 2025-03-20 DIAGNOSIS — M25.552 LEFT HIP PAIN: ICD-10-CM

## 2025-03-20 PROCEDURE — 97530 THERAPEUTIC ACTIVITIES: CPT | Performed by: PHYSICAL THERAPIST

## 2025-03-20 PROCEDURE — 97110 THERAPEUTIC EXERCISES: CPT | Performed by: PHYSICAL THERAPIST

## 2025-03-20 NOTE — TELEPHONE ENCOUNTER
Yes, likely his muscles moving things around and feeling some new motions. We will revisit when I get new XR at his next visit

## 2025-03-20 NOTE — TELEPHONE ENCOUNTER
Caller: patient     Doctor: Dr. Rogers     Reason for call: patient feels a bumping or clunking feeling when walking, its not smooth.  No pain.    Asking if this is normal?    Call back#: [phone]

## 2025-03-20 NOTE — PROGRESS NOTES
"Daily Note     Today's date: 3/20/2025  Patient name: Brown Britt  : 1965  MRN: [id number]  Referring provider: Jack Rogers DO  Dx:   Encounter Diagnosis     ICD-10-CM    1. Primary osteoarthritis of one hip, left  M16.12       2. Status post total replacement of left hip  Z96.642       3. Left hip pain  M25.552           Start Time: 1345  Stop Time: 1430  Total time in clinic (min): 45 minutes    Subjective: The patient continues to deny L hip pain. He is no longer using a cane.      Objective: See treatment diary below      Assessment: Patient continues to progress well. Step ups performed without difficulty. Deficits remain in L hip strength. Patient able to perform 15 reps SLR but was unable to complete any additional. Tolerated treatment well. Patient would benefit from continued PT      Plan: Continue per plan of care.      Precautions: L THR 3-10-25  HEP issued         2/25 3/12 3/17 3/20         Manuals                          Assessment  JM                        Neuro Re-Ed             SLS                                                                                           Ther Ex             SKTC 10\"x10 w/ towel            Piri stretch 10\"x10 w/ towel            SLR 10x            Iso hip add 5\"x10 in HL  5\"x20 in HL 5\"x20 in HL         Clam             HS stretch 30\"x3 on 8\" step  30\"x3 on 8\" step 30\"x3 on 8\" step         LAQ  5\"x10  5\"x20 5\"x20         Heel slide  10x D/C          Sliding abd  15x D/C          SAQ  5\"x15 D/C          SLR  5x AAROM 10x AROM 1x10, 1x5 AROM         Bridges   5\"x20 5\"x20         Standing hip abd,ext,march   2x10 ea BLE 2x10 ea BLE         NuStep for hip mobility and ms endurance   L5 10' L5 10'         Ther Activity             STS  10x from plinth 2x10  chair+AE 2x10 chair+AE         Step ups    8\" step 2x10         Gait Training                                       Modalities             CP                                 "

## 2025-03-24 ENCOUNTER — OFFICE VISIT (OUTPATIENT)
Dept: FAMILY MEDICINE CLINIC | Facility: CLINIC | Age: 60
End: 2025-03-24
Payer: COMMERCIAL

## 2025-03-24 ENCOUNTER — TELEPHONE (OUTPATIENT)
Age: 60
End: 2025-03-24

## 2025-03-24 VITALS
OXYGEN SATURATION: 97 % | WEIGHT: 282 LBS | BODY MASS INDEX: 40.37 KG/M2 | TEMPERATURE: 98.4 F | HEIGHT: 70 IN | HEART RATE: 104 BPM

## 2025-03-24 DIAGNOSIS — H57.12 ACUTE LEFT EYE PAIN: Primary | ICD-10-CM

## 2025-03-24 PROCEDURE — 99213 OFFICE O/P EST LOW 20 MIN: CPT | Performed by: FAMILY MEDICINE

## 2025-03-24 RX ORDER — TERBINAFINE HYDROCHLORIDE 250 MG/1
1 TABLET ORAL DAILY
COMMUNITY
Start: 2025-03-03

## 2025-03-24 NOTE — PROGRESS NOTES
"Name: Brown Britt      : 1965      MRN: [id number]  Encounter Provider: Timo Castano DO  Encounter Date: 3/24/2025   Encounter department: [facility]  :  Assessment & Plan  Acute left eye pain  I cannot explain the patient's symptoms.  There are no temporal bruits.  There are no proximal muscle weakness.  I called Dr. Melendez, the patient's ophthalmologist.  He will see the patient today at 2:15 PM at his office and will let me know what he believes is the etiology.  The patient has no history of migraine headaches.              History of Present Illness   The patient is a pleasant 59-year-old male who presents today with left eye pain.  He has no photosensitivity.  He has no eye redness.  He has no eye discharge.  He did have his cataracts removed but that was a while back.  He has no history of glaucoma.  His ophthalmologist is Dr. Catherine in [place].  His last visit was 2024.    The patient describes this discomfort as like a stabbing knifelike pain.  It has been increasing in frequency over the past 24 hours.  No history of eye trauma.  He does not wear contact lenses.  Patient states that is starting to last a little longer.  He reports no weakness in his proximal muscles.      Review of Systems   Constitutional:  Negative for chills and fever.   HENT:  Negative for ear pain and sore throat.    Eyes:  Positive for pain. Negative for visual disturbance.   Respiratory:  Negative for cough and shortness of breath.    Cardiovascular:  Negative for chest pain and palpitations.   Gastrointestinal:  Negative for abdominal pain and vomiting.   Genitourinary:  Negative for dysuria and hematuria.   Musculoskeletal:  Negative for arthralgias and back pain.   Skin:  Negative for color change and rash.   Neurological:  Negative for seizures and syncope.   All other systems reviewed and are negative.      Objective   Pulse 104   Temp 98.4 °F (36.9 °C)   Ht 5' 10\" (1.778 m)   Wt 128 kg " (282 lb)   SpO2 97%   BMI 40.46 kg/m²      Physical Exam  Vitals and nursing note reviewed.   Constitutional:       General: He is not in acute distress.     Appearance: Normal appearance. He is well-developed.   HENT:      Head: Normocephalic and atraumatic.      Right Ear: Tympanic membrane normal.      Left Ear: Tympanic membrane normal.      Mouth/Throat:      Mouth: Mucous membranes are moist.      Pharynx: Oropharynx is clear.   Eyes:      General: No scleral icterus.        Right eye: No discharge.         Left eye: No discharge.      Extraocular Movements: Extraocular movements intact.      Conjunctiva/sclera: Conjunctivae normal.      Pupils: Pupils are equal, round, and reactive to light.      Comments: Bilateral retinal exam has been difficult due to pupillary constriction.  I did not appreciate any floaters.  I did not appreciate any signs of vitreous or retinal detachment.  Optic nerve was visualized but I did not appreciate a significant papilledema.   Cardiovascular:      Rate and Rhythm: Normal rate and regular rhythm.      Heart sounds: Normal heart sounds. No murmur heard.     No friction rub.   Pulmonary:      Effort: Pulmonary effort is normal. No respiratory distress.      Breath sounds: Normal breath sounds.   Abdominal:      General: Bowel sounds are normal.      Palpations: Abdomen is soft.      Tenderness: There is no abdominal tenderness. There is no guarding or rebound.      Hernia: No hernia is present.   Musculoskeletal:         General: No swelling.      Cervical back: Neck supple.   Skin:     General: Skin is warm and dry.      Capillary Refill: Capillary refill takes less than 2 seconds.   Neurological:      General: No focal deficit present.      Mental Status: He is alert and oriented to person, place, and time.      Gait: Gait normal.   Psychiatric:         Mood and Affect: Mood normal.         Behavior: Behavior normal.         Thought Content: Thought content normal.

## 2025-03-24 NOTE — TELEPHONE ENCOUNTER
Patient called in to give Dr. Castano an update on his visit with the optamatrist. He says that Dr. Melendez said he was having some kind of spasm, eyes are in excellent condition, no problem with eye pressure!  Dr. Melendez should be reaching out as well. He thanks Dr. Castano! Thank you!     Brown: [phone]

## 2025-03-25 ENCOUNTER — OFFICE VISIT (OUTPATIENT)
Dept: PHYSICAL THERAPY | Facility: CLINIC | Age: 60
End: 2025-03-25
Payer: COMMERCIAL

## 2025-03-25 DIAGNOSIS — M25.552 LEFT HIP PAIN: ICD-10-CM

## 2025-03-25 DIAGNOSIS — Z96.642 STATUS POST TOTAL REPLACEMENT OF LEFT HIP: ICD-10-CM

## 2025-03-25 DIAGNOSIS — M16.12 PRIMARY OSTEOARTHRITIS OF ONE HIP, LEFT: Primary | ICD-10-CM

## 2025-03-25 PROCEDURE — 97530 THERAPEUTIC ACTIVITIES: CPT

## 2025-03-25 PROCEDURE — 97110 THERAPEUTIC EXERCISES: CPT

## 2025-03-25 NOTE — PROGRESS NOTES
"Daily Note     Today's date: 3/25/2025  Patient name: Brown Britt  : 1965  MRN: [id number]  Referring provider: Jack Rogers DO  Dx:   Encounter Diagnosis     ICD-10-CM    1. Primary osteoarthritis of one hip, left  M16.12       2. Status post total replacement of left hip  Z96.642       3. Left hip pain  M25.552                      Subjective: Patient reports that he feels he is doing overall well. He has some crepitus w/o pain.       Objective: See treatment diary below. Incorporated resistance into LAQ and clamshells to improve L hip strength.      Assessment: Tolerated treatment well. Patient would benefit from continued PT to improve L hip strength and stability to perform functional activities for longer periods of time. Patient did overall well with program today. He is consistently improving. Progress to tolerance.       Plan: Continue per plan of care.      Precautions: L THR 3-10-25  HEP issued     2/25 3/12 3/17 3/20 3/25    Manuals                  Assessment                  Neuro Re-Ed         SLS     NV                                                          Ther Ex         Iso hip add 5\"x10 in HL  5\"x20 in HL 5\"x20 in HL 5\"x20 in HL    Clam     L4 2x10    HS stretch 30\"x3 on 8\" step  30\"x3 on 8\" step 30\"x3 on 8\" step 30\"x3 on 8\" step    LAQ  5\"x10  5\"x20 5\"x20 3# 2x10    SLR  5x AAROM 10x AROM 1x10, 1x5 AROM Active 4x5    Bridges   5\"x20 5\"x20 5\"x20    Standing hip abd,ext,march   2x10 ea BLE 2x10 ea BLE 2x10 BLE 2#   NuStep for hip mobility and ms endurance   L5 10' L5 10' L6 10'    Ther Activity         STS  10x from plinth 2x10  chair+AE 2x10 chair+AE 2x10 chair + AE    Step ups    8\" step 2x10 8\" step 2x10    Side stepping     NV    Gait Training                           Modalities         CP                           "

## 2025-03-27 ENCOUNTER — OFFICE VISIT (OUTPATIENT)
Dept: PHYSICAL THERAPY | Facility: CLINIC | Age: 60
End: 2025-03-27
Payer: COMMERCIAL

## 2025-03-27 DIAGNOSIS — Z96.642 STATUS POST TOTAL REPLACEMENT OF LEFT HIP: ICD-10-CM

## 2025-03-27 DIAGNOSIS — M25.552 LEFT HIP PAIN: ICD-10-CM

## 2025-03-27 DIAGNOSIS — M16.12 PRIMARY OSTEOARTHRITIS OF ONE HIP, LEFT: Primary | ICD-10-CM

## 2025-03-27 PROCEDURE — 97530 THERAPEUTIC ACTIVITIES: CPT

## 2025-03-27 PROCEDURE — 97110 THERAPEUTIC EXERCISES: CPT

## 2025-03-27 PROCEDURE — 97112 NEUROMUSCULAR REEDUCATION: CPT

## 2025-03-27 NOTE — PROGRESS NOTES
"Daily Note     Today's date: 3/27/2025  Patient name: Brown Britt  : 1965  MRN: [id number]  Referring provider: Jack Rogers DO  Dx:   Encounter Diagnosis     ICD-10-CM    1. Primary osteoarthritis of one hip, left  M16.12       2. Status post total replacement of left hip  Z96.642       3. Left hip pain  M25.552                      Subjective: Patient reports that his L hip has more tightness/stiffness       Objective: See treatment diary below. Incorporated balance and resistance to improve strength and stability.      Assessment: Tolerated treatment well. Patient would benefit from continued PT to improve L hip strength and stability to perform functional activities for longer periods of time. Patient did overall well with progression today. Continue to progress to tolerance.       Plan: Continue per plan of care.      Precautions: L THR 3-10-25  HEP issued     2/25 3/12 3/17 3/20 3/25 3/27   Manuals                  Assessment  JM                Neuro Re-Ed         SLS     NV On firm 30\"x2   Tandem stance       On AE 30\"x2                                                Ther Ex         Iso hip add 5\"x10 in HL  5\"x20 in HL 5\"x20 in HL 5\"x20 in HL 5\"x20 in HL   Clam     L4 2x10 Progressed   HS stretch 30\"x3 on 8\" step  30\"x3 on 8\" step 30\"x3 on 8\" step 30\"x3 on 8\" step 30\"x3 on 8\" step   LAQ  5\"x10  5\"x20 5\"x20 3# 2x10 3# 2x10   SLR  5x AAROM 10x AROM 1x10, 1x5 AROM Active 4x5 Active 2x10   SL hip abd         Bridges   5\"x20 5\"x20 5\"x20 5\"x20   Standing hip abd,ext,march   2x10 ea BLE 2x10 ea BLE 2x10 BLE BLE 2# 2x10   NuStep for hip mobility and ms endurance   L5 10' L5 10' L6 10' L6 10'   Ther Activity         STS  10x from plinth 2x10  chair+AE 2x10 chair+AE 2x10 chair + AE 2x10 chair + AE   Step ups    8\" step 2x10 8\" step 2x10 8\" steps 2x10   Side stepping     NV L2 5x// bars   Gait Training                           Modalities         CP                             "

## 2025-03-31 DIAGNOSIS — R80.9 MICROALBUMINURIA: ICD-10-CM

## 2025-03-31 DIAGNOSIS — M16.12 PRIMARY OSTEOARTHRITIS OF ONE HIP, LEFT: ICD-10-CM

## 2025-03-31 DIAGNOSIS — Z90.3 S/P GASTRIC SLEEVE PROCEDURE: ICD-10-CM

## 2025-03-31 RX ORDER — ERGOCALCIFEROL 1.25 MG/1
1 CAPSULE ORAL WEEKLY
Qty: 12 CAPSULE | Refills: 1 | Status: SHIPPED | OUTPATIENT
Start: 2025-03-31

## 2025-03-31 RX ORDER — LOSARTAN POTASSIUM 25 MG/1
25 TABLET ORAL DAILY
Qty: 90 TABLET | Refills: 1 | Status: SHIPPED | OUTPATIENT
Start: 2025-03-31

## 2025-04-01 ENCOUNTER — OFFICE VISIT (OUTPATIENT)
Dept: PHYSICAL THERAPY | Facility: CLINIC | Age: 60
End: 2025-04-01
Payer: COMMERCIAL

## 2025-04-01 ENCOUNTER — TELEPHONE (OUTPATIENT)
Age: 60
End: 2025-04-01

## 2025-04-01 DIAGNOSIS — M25.552 LEFT HIP PAIN: ICD-10-CM

## 2025-04-01 DIAGNOSIS — M16.12 PRIMARY OSTEOARTHRITIS OF ONE HIP, LEFT: Primary | ICD-10-CM

## 2025-04-01 DIAGNOSIS — Z96.642 STATUS POST TOTAL REPLACEMENT OF LEFT HIP: ICD-10-CM

## 2025-04-01 PROCEDURE — 97110 THERAPEUTIC EXERCISES: CPT | Performed by: PHYSICAL THERAPIST

## 2025-04-01 PROCEDURE — 97112 NEUROMUSCULAR REEDUCATION: CPT | Performed by: PHYSICAL THERAPIST

## 2025-04-01 PROCEDURE — 97530 THERAPEUTIC ACTIVITIES: CPT | Performed by: PHYSICAL THERAPIST

## 2025-04-01 RX ORDER — ASPIRIN 81 MG/1
81 TABLET ORAL 2 TIMES DAILY
Qty: 56 TABLET | Refills: 0 | Status: SHIPPED | OUTPATIENT
Start: 2025-04-01 | End: 2025-04-29

## 2025-04-01 NOTE — PROGRESS NOTES
"Daily Note     Today's date: 2025  Patient name: Brown Britt  : 1965  MRN: [id number]  Referring provider: Jack Rogers DO  Dx:   Encounter Diagnosis     ICD-10-CM    1. Primary osteoarthritis of one hip, left  M16.12       2. Status post total replacement of left hip  Z96.642       3. Left hip pain  M25.552           Start Time: 1345  Stop Time: 1430  Total time in clinic (min): 45 minutes    Subjective: The patient states that the clicking is improving in his L hip.      Objective: See treatment diary below      Assessment: LLE balance is slowly improving. Gains are noted in L hip strength and stability. Patient is ambulating with a normal gait pattern without an AD. Tolerated treatment well. Patient would benefit from continued PT      Plan: Continue per plan of care.      Precautions: L THR 3-10-25  HEP issued     4/1 3/12 3/17 3/20 3/25 3/27   Manuals                  Assessment  JM                Neuro Re-Ed         SLS 30\"x2 on firm    NV On firm 30\"x2   Tandem stance  On AE 30\"x2 ea     On AE 30\"x2                                                Ther Ex         Iso hip add 5\"x10 in HL  5\"x20 in HL 5\"x20 in HL 5\"x20 in HL 5\"x20 in HL   Clam     L4 2x10 Progressed   HS stretch 30\"x3 on 8\" step  30\"x3 on 8\" step 30\"x3 on 8\" step 30\"x3 on 8\" step 30\"x3 on 8\" step   LAQ 3# 2x10 5\"x10  5\"x20 5\"x20 3# 2x10 3# 2x10   SLR Active 2x10 5x AAROM 10x AROM 1x10, 1x5 AROM Active 4x5 Active 2x10   SL hip abd         Bridges 5\"x20  5\"x20 5\"x20 5\"x20 5\"x20   Standing hip abd,ext,march BLE 2# 2x10 ea  2x10 ea BLE 2x10 ea BLE 2x10 BLE BLE 2# 2x10   NuStep for hip mobility and ms endurance L6 10'  L5 10' L5 10' L6 10' L6 10'   Ther Activity         STS 2x10 chair+AE 10x from plinth 2x10  chair+AE 2x10 chair+AE 2x10 chair + AE 2x10 chair + AE   Step ups 8\" step 20x F/L   8\" step 2x10 8\" step 2x10 8\" steps 2x10   Side stepping L2 5x //bars    NV L2 5x// bars   Gait Training                           Modalities     "     CP

## 2025-04-01 NOTE — TELEPHONE ENCOUNTER
Patient is scheduled to get a tooth extracted tomorrow. He would like to know if there is any medication Dr. Castano would like him to stop taking before? Please advise.

## 2025-04-03 ENCOUNTER — APPOINTMENT (OUTPATIENT)
Dept: PHYSICAL THERAPY | Facility: CLINIC | Age: 60
End: 2025-04-03
Payer: COMMERCIAL

## 2025-04-04 DIAGNOSIS — M16.12 PRIMARY OSTEOARTHRITIS OF ONE HIP, LEFT: ICD-10-CM

## 2025-04-07 RX ORDER — TERBINAFINE HYDROCHLORIDE 250 MG/1
250 TABLET ORAL DAILY
Refills: 0 | OUTPATIENT
Start: 2025-04-07

## 2025-04-08 ENCOUNTER — OFFICE VISIT (OUTPATIENT)
Dept: PHYSICAL THERAPY | Facility: CLINIC | Age: 60
End: 2025-04-08
Payer: COMMERCIAL

## 2025-04-08 DIAGNOSIS — M16.12 PRIMARY OSTEOARTHRITIS OF ONE HIP, LEFT: Primary | ICD-10-CM

## 2025-04-08 DIAGNOSIS — Z96.642 STATUS POST TOTAL REPLACEMENT OF LEFT HIP: ICD-10-CM

## 2025-04-08 DIAGNOSIS — M25.552 LEFT HIP PAIN: ICD-10-CM

## 2025-04-08 PROCEDURE — 97112 NEUROMUSCULAR REEDUCATION: CPT

## 2025-04-08 PROCEDURE — 97110 THERAPEUTIC EXERCISES: CPT

## 2025-04-08 RX ORDER — ASPIRIN 81 MG/1
81 TABLET ORAL 2 TIMES DAILY
Qty: 56 TABLET | Refills: 0 | OUTPATIENT
Start: 2025-04-08 | End: 2025-05-06

## 2025-04-08 NOTE — PROGRESS NOTES
"Daily Note     Today's date: 2025  Patient name: Brown Britt  : 1965  MRN: [id number]  Referring provider: Jack Rogers DO  Dx:   Encounter Diagnosis     ICD-10-CM    1. Primary osteoarthritis of one hip, left  M16.12       2. Status post total replacement of left hip  Z96.642       3. Left hip pain  M25.552                      Subjective: Patient reports that he is doing more at home. He is overall doing well.       Objective: See treatment diary below.       Assessment: Tolerated treatment well. Patient would benefit from continued PT to improve L hip strength and stability to perform functional activities for longer periods of time. Patient did overall well with progression today. Continue to progress to tolerance.       Plan: Continue per plan of care.      Precautions: L THR 3-10-25  HEP issued     4/1 4/8 3/17 3/20 3/25 3/27   Manuals                  Assessment                  Neuro Re-Ed         SLS 30\"x2 on firm 30\"x2 on AE   NV On firm 30\"x2   Tandem stance  On AE 30\"x2 ea On AE 30\"x2     On AE 30\"x2   PB squats  2x10                                           Ther Ex         Iso hip add 5\"x10 in HL 5\"x20 in HL 5\"x20 in HL 5\"x20 in HL 5\"x20 in HL 5\"x20 in HL   Clam  In SL LLE 2x10       HS stretch 30\"x3 on 8\" step 30\"x3 on 8\" step 30\"x3 on 8\" step 30\"x3 on 8\" step 30\"x3 on 8\" step 30\"x3 on 8\" step   LAQ 3# 2x10 progressed 5\"x20 5\"x20 3# 2x10 3# 2x10   SLR Active 2x10 Active 2x10 10x AROM 1x10, 1x5 AROM Active 4x5 Active 2x10   HS curl machine S:10  P7 3x10       Leg extension S:2  P2 2x10       Bridges 5\"x20 5\"x20 5\"x20 5\"x20 5\"x20 5\"x20   Standing hip abd,ext,march BLE 2# 2x10 ea BLE 2# 2x10 ea 2x10 ea BLE 2x10 ea BLE 2x10 BLE BLE 2# 2x10   NuStep for hip mobility and ms endurance L6 10' L6 10' L5 10' L5 10' L6 10' L6 10'   Ther Activity         STS 2x10 chair+AE 2x10 chair+AE 2x10  chair+AE 2x10 chair+AE 2x10 chair + AE 2x10 chair + AE   Step ups 8\" step 20x F/L 8\" step 20x F/L  8\" " "step 2x10 8\" step 2x10 8\" steps 2x10   Side stepping L2 5x //bars L3 5x// bars   NV L2 5x// bars   Gait Training                           Modalities         CP                                 "

## 2025-04-10 ENCOUNTER — EVALUATION (OUTPATIENT)
Dept: PHYSICAL THERAPY | Facility: CLINIC | Age: 60
End: 2025-04-10
Payer: COMMERCIAL

## 2025-04-10 DIAGNOSIS — M16.12 PRIMARY OSTEOARTHRITIS OF ONE HIP, LEFT: Primary | ICD-10-CM

## 2025-04-10 DIAGNOSIS — Z96.642 STATUS POST TOTAL REPLACEMENT OF LEFT HIP: ICD-10-CM

## 2025-04-10 DIAGNOSIS — M25.552 LEFT HIP PAIN: ICD-10-CM

## 2025-04-10 PROCEDURE — 97112 NEUROMUSCULAR REEDUCATION: CPT | Performed by: PHYSICAL THERAPIST

## 2025-04-10 PROCEDURE — 97110 THERAPEUTIC EXERCISES: CPT | Performed by: PHYSICAL THERAPIST

## 2025-04-10 NOTE — PROGRESS NOTES
PT Re-Evaluation     Today's date: 4/10/2025  Patient name: Brown Britt  : 1965  MRN: [id number]  Referring provider: Jack Rogers DO  Dx:   Encounter Diagnosis     ICD-10-CM    1. Primary osteoarthritis of one hip, left  M16.12       2. Status post total replacement of left hip  Z96.642       3. Left hip pain  M25.552             Start Time: 1345  Stop Time: 1430  Total time in clinic (min): 45 minutes    Assessment  Impairments: abnormal or restricted ROM, impaired physical strength, lacks appropriate home exercise program and pain with function    Assessment details: The patient is progressing well with physical therapy treatment s/p L THR on 3-10-25 by Dr. Rogers. He is walking well without an AD and can negotiate stairs reciprocally. Some deficits remain in L hip strength. He is scheduled to RTD on 25. He will continue with PT until then and will then transition to HEP.    Goals  STG(6 weeks):               1. Independent with HEP  MET            2. Decrease pain to 4/10 at best with functional activities  MET            3. Increase AROM L hip to WFL  MET            4. Increase strength LLE by 1/2 MMT grade  MET            5. Patient will ambulate household distances with least restrictive AD  MET     LTG(12 weeks):               1. Decrease pain to 2/10 at worst with functional activities  MET             2. Increase LLE strength to 4+ to 5/5 PARTIALLY MET             3.Patient will ambulate community distances with least restrictive AD  MET             4. Return to PLOF   PARTIALLY MET    Plan  Patient would benefit from: skilled physical therapy  Planned modality interventions: cryotherapy    Planned therapy interventions: abdominal trunk stabilization, balance, manual therapy, neuromuscular re-education, patient/caregiver education, gait training, strengthening, stretching, therapeutic activities, therapeutic exercise and home exercise program    Frequency: 2-3x week  Duration in weeks:  12  Plan of Care beginning date: 2/25/2025  Plan of Care expiration date: 5/20/2025  Treatment plan discussed with: patient        Subjective Evaluation    History of Present Illness  Mechanism of injury: The patient reports a long history of L hip pain. He started to notice a limp and he was having a hard time getting comfortable to sleep. Xrays revealed significant OA. He is scheduled for a L THR on 3-10-25 by Dr. Rogers. He will be seen today for a pre-operative visit. The patient has a stair glide to his second floor and is installing a higher toilet this weekend. He has a RW at home.     UPDATE 3/12/25:  The patient is s/p L THR on 3-10-25. He was discharged home the same day. The patient reports 1-2/10 at rest and 3-4/10 when walking.  He has been using ice and pain meds as directed. RTD 3-18-25.    UPDATE 4/10/25:  The patient reports that he feels good. He is able to negotiate stairs reciprocally. He is able to walk as long as her wants without pain. RTD 4-22-25.   Patient Goals  Patient goal: To have normal ROM, be able to get my socks on easier  Social Support  Steps to enter house: yes  Stairs in house: yes   Lives in: multiple-level home  Lives with: parents    Employment status: not working        Objective     Active Range of Motion   Left Hip   Flexion: WFL  Abduction: WFL    Strength/Myotome Testing     Left Hip   Planes of Motion   Flexion: 4  Abduction: 4    Right Hip   Planes of Motion   Flexion: 4+  Extension: 4+  Abduction: 4+    Left Knee   Flexion: 5  Extension: 5    Tests     Additional Tests Details  Flexibility   Hamstring  L  Fair, R Fair                    Piriformis   L Fair, R Poor    Ambulation     Ambulation: Level Surfaces   Ambulation without assistive device: independent    Ambulation: Stairs   Ascend stairs: independent  Pattern: reciprocal  Railings: one rail  Descend stairs: independent  Pattern: reciprocal  Railings: one rail             Precautions: L THR 3-10-25  HEP  "issued         4/1 4/8 4/10 3/20 3/25 3/27   Manuals                  Assessment                  Neuro Re-Ed         SLS 30\"x2 on firm 30\"x2 on AE 30\"x2 on AE  NV On firm 30\"x2   Tandem stance  On AE 30\"x2 ea On AE 30\"x2  On AE 30\"x2   On AE 30\"x2   PB squats  2x10 2x10                                          Ther Ex         Iso hip add 5\"x10 in HL 5\"x20 in HL 5\"x20 in HL 5\"x20 in HL 5\"x20 in HL 5\"x20 in HL   Clam  In SL LLE 2x10 In SL LLE 2x10      HS stretch 30\"x3 on 8\" step 30\"x3 on 8\" step 30\"x3 on 8\" step 30\"x3 on 8\" step 30\"x3 on 8\" step 30\"x3 on 8\" step   LAQ 3# 2x10 progressed  5\"x20 3# 2x10 3# 2x10   SLR Active 2x10 Active 2x10 2x10 1x10, 1x5 AROM Active 4x5 Active 2x10   HS curl machine S:10  P7 3x10 P7 3x10      Leg extension S:2  P2 2x10 P2 2x10      Bridges 5\"x20 5\"x20 2x10 w/ march 5\"x20 5\"x20 5\"x20   Standing hip abd,ext,march BLE 2# 2x10 ea BLE 2# 2x10 ea 3# 2x10 ea BLE 2x10 ea BLE 2x10 BLE BLE 2# 2x10   NuStep for hip mobility and ms endurance L6 10' L6 10' L7 10' L5 10' L6 10' L6 10'   Ther Activity         STS 2x10 chair+AE 2x10 chair+AE 2x10  chair+AE 2x10 chair+AE 2x10 chair + AE 2x10 chair + AE   Step ups 8\" step 20x F/L 8\" step 20x F/L 8\" step 20x F/L 8\" step 2x10 8\" step 2x10 8\" steps 2x10   Side stepping L2 5x //bars L3 5x// bars L3 5x //bars  NV L2 5x// bars   Gait Training                           Modalities         CP                       "

## 2025-04-14 ENCOUNTER — TELEPHONE (OUTPATIENT)
Age: 60
End: 2025-04-14

## 2025-04-14 DIAGNOSIS — D22.9 ATYPICAL MOLE: Primary | ICD-10-CM

## 2025-04-14 NOTE — TELEPHONE ENCOUNTER
Patient called and stated that [facility] does not accept his insurance.     Patient stated he will call his insurance company to see where he can be seen.     Patient will return a call with updates.

## 2025-04-14 NOTE — TELEPHONE ENCOUNTER
[facility] called in stating they received referral of pt, but states they do no accept his insurance, as it is listed under Medicaid and they no longer accept Medicaid.

## 2025-04-14 NOTE — TELEPHONE ENCOUNTER
Patient calling for a  Referral to Dermatology for a mole above his eye.       [facility]  [address], [place] [address]  [phone]    Please let patient know when the referral is in.  His # [phone]

## 2025-04-14 NOTE — TELEPHONE ENCOUNTER
Patient found a dermatologist who accepts his insurance - [facility].  Their phone number is [phone].  Please contact them for fax number and send.  Thank you.

## 2025-04-15 ENCOUNTER — OFFICE VISIT (OUTPATIENT)
Dept: PHYSICAL THERAPY | Facility: CLINIC | Age: 60
End: 2025-04-15
Payer: COMMERCIAL

## 2025-04-15 DIAGNOSIS — M16.12 PRIMARY OSTEOARTHRITIS OF ONE HIP, LEFT: Primary | ICD-10-CM

## 2025-04-15 DIAGNOSIS — M25.552 LEFT HIP PAIN: ICD-10-CM

## 2025-04-15 DIAGNOSIS — Z96.642 STATUS POST TOTAL REPLACEMENT OF LEFT HIP: ICD-10-CM

## 2025-04-15 PROCEDURE — 97112 NEUROMUSCULAR REEDUCATION: CPT

## 2025-04-15 PROCEDURE — 97530 THERAPEUTIC ACTIVITIES: CPT

## 2025-04-15 PROCEDURE — 97110 THERAPEUTIC EXERCISES: CPT

## 2025-04-15 NOTE — PROGRESS NOTES
"Daily Note     Today's date: 4/15/2025  Patient name: Brown Britt  : 1965  MRN: [id number]  Referring provider: Jack Rogers DO  Dx:   Encounter Diagnosis     ICD-10-CM    1. Primary osteoarthritis of one hip, left  M16.12       2. Status post total replacement of left hip  Z96.642       3. Left hip pain  M25.552                      Subjective: Patient reports that he is doing well, he is able to do everything functional that he needs to. RTD .       Objective: See treatment diary below. Increased resistance today.      Assessment: Tolerated treatment well. Patient would benefit from continued PT to improve L hip strength and stability to perform functional activities for longer periods of time. Patient did overall well with progression today and strength is improving consistently. Continue to progress to tolerance.       Plan: Continue per plan of care.      Precautions: L THR 3-10-25  HEP issued       4/1 4/8 4/10 4/15 3/25 3/27   Manuals                  Assessment                  Neuro Re-Ed         SLS 30\"x2 on firm 30\"x2 on AE 30\"x2 on AE On AE 30\"x2 NV On firm 30\"x2   Tandem stance  On AE 30\"x2 ea On AE 30\"x2  On AE 30\"x2 On AE 30\"x2  On AE 30\"x2   PB squats  2x10 2x10 2x10                                         Ther Ex         Iso hip add 5\"x10 in HL 5\"x20 in HL 5\"x20 in HL 5\"x20 in HL 5\"x20 in HL 5\"x20 in HL   Clam  In SL LLE 2x10 In SL LLE 2x10 In SL LLE 2x10     HS stretch 30\"x3 on 8\" step 30\"x3 on 8\" step 30\"x3 on 8\" step 30\"x3 on 8\" step 30\"x3 on 8\" step 30\"x3 on 8\" step   LAQ 3# 2x10 progressed   3# 2x10 3# 2x10   SLR Active 2x10 Active 2x10 2x10 2# 2x10 Active 4x5 Active 2x10   HS curl machine S:10  P7 3x10 P7 3x10 P7 3x10     Leg extension S:2  P2 2x10 P2 2x10 P2 3x10     Bridges 5\"x20 5\"x20 2x10 w/ march 2x10 w/ march 5\"x20 5\"x20   Standing hip abd,ext,march BLE 2# 2x10 ea BLE 2# 2x10 ea 3# 2x10 ea BLE 4# 2x10 BLE 2x10 BLE BLE 2# 2x10   NuStep for hip mobility and ms " "endurance L6 10' L6 10' L7 10' L7 10' L6 10' L6 10'   Ther Activity         STS 2x10 chair+AE 2x10 chair+AE 2x10  chair+AE Chair 2x10 2x10 chair + AE 2x10 chair + AE   Step ups 8\" step 20x F/L 8\" step 20x F/L 8\" step 20x F/L 8\" step 20x F/L 8\" step 2x10 8\" steps 2x10   Side stepping L2 5x //bars L3 5x// bars L3 5x //bars L4 5x// bars  NV L2 5x// bars   Gait Training                           Modalities         CP                       "

## 2025-04-17 ENCOUNTER — OFFICE VISIT (OUTPATIENT)
Dept: PHYSICAL THERAPY | Facility: CLINIC | Age: 60
End: 2025-04-17
Payer: COMMERCIAL

## 2025-04-17 DIAGNOSIS — Z96.642 STATUS POST TOTAL REPLACEMENT OF LEFT HIP: ICD-10-CM

## 2025-04-17 DIAGNOSIS — M16.12 PRIMARY OSTEOARTHRITIS OF ONE HIP, LEFT: Primary | ICD-10-CM

## 2025-04-17 DIAGNOSIS — M25.552 LEFT HIP PAIN: ICD-10-CM

## 2025-04-17 PROCEDURE — 97112 NEUROMUSCULAR REEDUCATION: CPT | Performed by: PHYSICAL THERAPIST

## 2025-04-17 PROCEDURE — 97110 THERAPEUTIC EXERCISES: CPT | Performed by: PHYSICAL THERAPIST

## 2025-04-17 PROCEDURE — 97530 THERAPEUTIC ACTIVITIES: CPT | Performed by: PHYSICAL THERAPIST

## 2025-04-17 NOTE — PROGRESS NOTES
"Daily Note     Today's date: 2025  Patient name: Brown Britt  : 1965  MRN: [id number]  Referring provider: Jack Rogers DO  Dx:   Encounter Diagnosis     ICD-10-CM    1. Primary osteoarthritis of one hip, left  M16.12       2. Status post total replacement of left hip  Z96.642       3. Left hip pain  M25.552           Start Time: 1345  Stop Time: 1430  Total time in clinic (min): 45 minutes    Subjective: The patient reports that his L hip feels great. He follows up with the surgeon next week.      Objective: See treatment diary below      Assessment: Tolerated treatment well. Static balance on compliant surface has improved. Patient demonstrates good technique with all exercises.       Plan: Potential discharge next visit.     Precautions: L THR 3-10-25  HEP issued       4/1 4/8 4/10 4/15 4/17 3/27   Manuals                  Assessment                  Neuro Re-Ed         SLS 30\"x2 on firm 30\"x2 on AE 30\"x2 on AE On AE 30\"x2 On AE 30\"x2 On firm 30\"x2   Tandem stance  On AE 30\"x2 ea On AE 30\"x2  On AE 30\"x2 On AE 30\"x2 On AE 30\"x2 On AE 30\"x2   PB squats  2x10 2x10 2x10 2x10                                        Ther Ex         Iso hip add 5\"x10 in HL 5\"x20 in HL 5\"x20 in HL 5\"x20 in HL 5\"x20 in HL 5\"x20 in HL   Clam  In SL LLE 2x10 In SL LLE 2x10 In SL LLE 2x10 In SL LLE 2x10    HS stretch 30\"x3 on 8\" step 30\"x3 on 8\" step 30\"x3 on 8\" step 30\"x3 on 8\" step 30\"x3 on 8\" step 30\"x3 on 8\" step   LAQ 3# 2x10 progressed    3# 2x10   SLR Active 2x10 Active 2x10 2x10 2# 2x10 2# 2x10 Active 2x10   HS curl machine S:10  P7 3x10 P7 3x10 P7 3x10 P7 2x20    Leg extension S:2  P2 2x10 P2 2x10 P2 3x10 P2 2x20    Bridges 5\"x20 5\"x20 2x10 w/ march 2x10 w/ march 2x10 w/ march 5\"x20   Standing hip abd,ext,march BLE 2# 2x10 ea BLE 2# 2x10 ea 3# 2x10 ea BLE 4# 2x10 BLE 4# 2x10 BLE BLE 2# 2x10   NuStep for hip mobility and ms endurance L6 10' L6 10' L7 10' L7 10' L7 10' L6 10'   Ther Activity         STS 2x10 " "chair+AE 2x10 chair+AE 2x10  chair+AE Chair 2x10 2x10 chair 2x10 chair + AE   Step ups 8\" step 20x F/L 8\" step 20x F/L 8\" step 20x F/L 8\" step 20x F/L 8\" step 20x F/L 8\" steps 2x10   Side stepping L2 5x //bars L3 5x// bars L3 5x //bars L4 5x// bars  L4 5x //bars L2 5x// bars   Gait Training                           Modalities         CP                         "

## 2025-04-21 RX ORDER — MUPIROCIN 20 MG/G
OINTMENT TOPICAL
COMMUNITY
Start: 2025-04-18

## 2025-04-21 RX ORDER — CELECOXIB 200 MG/1
1 CAPSULE ORAL DAILY
COMMUNITY
Start: 2025-03-24

## 2025-04-22 ENCOUNTER — OFFICE VISIT (OUTPATIENT)
Dept: PHYSICAL THERAPY | Facility: CLINIC | Age: 60
End: 2025-04-22
Payer: COMMERCIAL

## 2025-04-22 ENCOUNTER — APPOINTMENT (OUTPATIENT)
Dept: RADIOLOGY | Facility: MEDICAL CENTER | Age: 60
End: 2025-04-22
Attending: STUDENT IN AN ORGANIZED HEALTH CARE EDUCATION/TRAINING PROGRAM
Payer: COMMERCIAL

## 2025-04-22 ENCOUNTER — OFFICE VISIT (OUTPATIENT)
Dept: OBGYN CLINIC | Facility: CLINIC | Age: 60
End: 2025-04-22

## 2025-04-22 VITALS
BODY MASS INDEX: 41.09 KG/M2 | TEMPERATURE: 98 F | OXYGEN SATURATION: 97 % | WEIGHT: 287 LBS | HEART RATE: 108 BPM | HEIGHT: 70 IN | RESPIRATION RATE: 16 BRPM

## 2025-04-22 DIAGNOSIS — Z96.642 STATUS POST TOTAL REPLACEMENT OF LEFT HIP: Primary | ICD-10-CM

## 2025-04-22 DIAGNOSIS — M16.12 PRIMARY OSTEOARTHRITIS OF ONE HIP, LEFT: Primary | ICD-10-CM

## 2025-04-22 DIAGNOSIS — M25.552 LEFT HIP PAIN: ICD-10-CM

## 2025-04-22 DIAGNOSIS — Z96.642 STATUS POST TOTAL REPLACEMENT OF LEFT HIP: ICD-10-CM

## 2025-04-22 PROCEDURE — 73502 X-RAY EXAM HIP UNI 2-3 VIEWS: CPT

## 2025-04-22 PROCEDURE — 99024 POSTOP FOLLOW-UP VISIT: CPT | Performed by: STUDENT IN AN ORGANIZED HEALTH CARE EDUCATION/TRAINING PROGRAM

## 2025-04-22 PROCEDURE — 97530 THERAPEUTIC ACTIVITIES: CPT | Performed by: PHYSICAL THERAPIST

## 2025-04-22 PROCEDURE — 97112 NEUROMUSCULAR REEDUCATION: CPT | Performed by: PHYSICAL THERAPIST

## 2025-04-22 PROCEDURE — 97110 THERAPEUTIC EXERCISES: CPT | Performed by: PHYSICAL THERAPIST

## 2025-04-22 NOTE — PROGRESS NOTES
Post-Operative Note: Total Hip Arthroplasty  Brown Britt (59 y.o. male)  : 1965 Encounter Date: 2025  Dr. Jack Rogers DO, Orthopedic Surgeon  [facility]     :  Assessment & Plan  Status post total replacement of left hip  6 week follow up status post left posterior total hip arthroplasty   Wound Care   Continue current care., Encouraged scar care with vitamin E oil.  Pain control: PRN  Continue ice packs/elevation  Can continue compression   Continue with physical therapy  Continue activities as tolerated  Maintain posterior hip precautions  Complete DVT ppx Completed ASA 81mg BID x 4weeks  Return in about 6 weeks (around 6/3/2025) for Recheck, Repeat X-ray left hip.  for evaluation with x-ray      Orders:    XR hip/pelv 2-3 vws left if performed          Subjective:  59 y.o. male  presenting to the office for 6 week follow up status post left posterior total hip arthroplasty (elective)  DOS: 3/10/2025    Current concerns: None  Pain: None reported  Assistive device: None  Numbness/weakness extremity: None Reported   Physical Therapy Progress: Completed  DVT ppx: ASA 81mg BID x 4 weeks  Abx: Completed 5 days of duricef  Eating/Drinking improving. Bowel/Bladder: WNL  Patient denies symptoms of an infection.     Physical Exam:   Exam: left hip  Incision: healing well no significant drainage no dehiscence no significant erythema  ROM: WNL  Sensation: normal   Brisk Capillary Refill  Positive tib ant, EHL, gastrocsoleus complex  Able to get up and out of a chair easily without any assistance    Imaging:  X-rays of left hip from 25 show well positioned arthroplasty with no evidence of failure or loosening of hardware         Scribe Attestation      I,:  Rae Darden am acting as a scribe while in the presence of the attending physician.:       I,:  Jack Rogers DO personally performed the services described in this documentation    as scribed in my presence.:

## 2025-04-22 NOTE — ASSESSMENT & PLAN NOTE
6 week follow up status post left posterior total hip arthroplasty   Wound Care   Continue current care., Encouraged scar care with vitamin E oil.  Pain control: PRN  Continue ice packs/elevation  Can continue compression   Continue with physical therapy  Continue activities as tolerated  Maintain posterior hip precautions  Complete DVT ppx Completed ASA 81mg BID x 4weeks  Return in about 6 weeks (around 6/3/2025) for Recheck, Repeat X-ray left hip.  for evaluation with x-ray      Orders:    XR hip/pelv 2-3 vws left if performed

## 2025-04-22 NOTE — PROGRESS NOTES
PT Discharge    Today's date: 2025  Patient name: Brown Britt  : 1965  MRN: [id number]  Referring provider: Jack Rogers DO  Dx:   Encounter Diagnosis     ICD-10-CM    1. Primary osteoarthritis of one hip, left  M16.12       2. Status post total replacement of left hip  Z96.642       3. Left hip pain  M25.552             Start Time: 1300  Stop Time: 1340  Total time in clinic (min): 40 minutes    Assessment    Assessment details: The patient has progressed well with physical therapy treatment s/p L THR on 3-10-25 by Dr. Rogers. He is walking well without an AD and can negotiate stairs reciprocally. He has resumed all normal daily activities. He is following up with Dr. Rogers later today. All PT goals have been met and patient will be discharged from formal PT at this time.     Goals  STG(6 weeks):               1. Independent with HEP  MET            2. Decrease pain to 4/10 at best with functional activities  MET            3. Increase AROM L hip to WFL  MET            4. Increase strength LLE by 1/2 MMT grade  MET            5. Patient will ambulate household distances with least restrictive AD  MET     LTG(12 weeks):               1. Decrease pain to 2/10 at worst with functional activities  MET             2. Increase LLE strength to 4+ to 5/5  MET             3.Patient will ambulate community distances with least restrictive AD  MET             4. Return to PLOF    MET    Plan    Treatment plan discussed with: patient        Subjective Evaluation    History of Present Illness  Mechanism of injury: The patient reports a long history of L hip pain. He started to notice a limp and he was having a hard time getting comfortable to sleep. Xrays revealed significant OA. He is scheduled for a L THR on 3-10-25 by Dr. Rogers. He will be seen today for a pre-operative visit. The patient has a stair glide to his second floor and is installing a higher toilet this weekend. He has a RW at home.     UPDATE  "3/12/25:  The patient is s/p L THR on 3-10-25. He was discharged home the same day. The patient reports 1-2/10 at rest and 3-4/10 when walking.  He has been using ice and pain meds as directed. RTD 3-18-25.    UPDATE 4/10/25:  The patient reports that he feels good. He is able to negotiate stairs reciprocally. He is able to walk as long as her wants without pain. RTD 4-22-25.     UPDATE 4/22/25:  The patient denies L hip pain. He has resumed all of his regular activities. RTD today after therapy.   Patient Goals  Patient goal: To have normal ROM, be able to get my socks on easier  Social Support  Steps to enter house: yes  Stairs in house: yes   Lives in: multiple-level home  Lives with: parents    Employment status: not working        Objective     Active Range of Motion   Left Hip   Flexion: WFL  Abduction: WFL    Strength/Myotome Testing     Left Hip   Planes of Motion   Flexion: 4+  Extension: 4+  Abduction: 4+    Right Hip   Planes of Motion   Flexion: 4+  Extension: 4+  Abduction: 4+    Left Knee   Flexion: 5  Extension: 5    Tests     Additional Tests Details  Flexibility   Hamstring  L  Fair, R Fair                    Piriformis   L Fair, R Fair    Ambulation     Ambulation: Level Surfaces   Ambulation without assistive device: independent    Ambulation: Stairs   Ascend stairs: independent  Pattern: reciprocal  Railings: one rail  Descend stairs: independent  Pattern: reciprocal  Railings: one rail             Precautions: L THR 3-10-25         4/1 4/8 4/10 4/15 4/17 4/22   Manuals                  Assessment                  Neuro Re-Ed         SLS 30\"x2 on firm 30\"x2 on AE 30\"x2 on AE On AE 30\"x2 On AE 30\"x2 On AE 30\"x2   Tandem stance  On AE 30\"x2 ea On AE 30\"x2  On AE 30\"x2 On AE 30\"x2 On AE 30\"x2    PB squats  2x10 2x10 2x10 2x10 2x10                                       Ther Ex         Iso hip add 5\"x10 in HL 5\"x20 in HL 5\"x20 in HL 5\"x20 in HL 5\"x20 in HL    Clam  In SL LLE 2x10 In SL LLE 2x10 In SL " "LLE 2x10 In SL LLE 2x10    HS stretch 30\"x3 on 8\" step 30\"x3 on 8\" step 30\"x3 on 8\" step 30\"x3 on 8\" step 30\"x3 on 8\" step 30\"x3 on 8\" step   LAQ 3# 2x10 progressed       SLR Active 2x10 Active 2x10 2x10 2# 2x10 2# 2x10    HS curl machine S:10  P7 3x10 P7 3x10 P7 3x10 P7 2x20 P7 2x20   Leg extension S:2  P2 2x10 P2 2x10 P2 3x10 P2 2x20 P2 2x20   Bridges 5\"x20 5\"x20 2x10 w/ march 2x10 w/ march 2x10 w/ march 5\"x20   Standing hip abd,ext,march BLE 2# 2x10 ea BLE 2# 2x10 ea 3# 2x10 ea BLE 4# 2x10 BLE 4# 2x10 BLE BLE 4# 2x10   NuStep for hip mobility and ms endurance L6 10' L6 10' L7 10' L7 10' L7 10' L7 10'   Ther Activity         STS 2x10 chair+AE 2x10 chair+AE 2x10  chair+AE Chair 2x10 2x10 chair 2x10 chair   Step ups 8\" step 20x F/L 8\" step 20x F/L 8\" step 20x F/L 8\" step 20x F/L 8\" step 20x F/L 8\" steps 2x10   Side stepping L2 5x //bars L3 5x// bars L3 5x //bars L4 5x// bars  L4 5x //bars L4 5x// bars   Gait Training                           Modalities         CP                       "

## 2025-04-23 RX ORDER — TERBINAFINE HYDROCHLORIDE 250 MG/1
250 TABLET ORAL DAILY
Refills: 0 | OUTPATIENT
Start: 2025-04-23

## 2025-04-23 NOTE — TELEPHONE ENCOUNTER
Pt returned call.  States that medication is for a toe nail fungus that Podiatry had prescribed for him in the past.  States he stopped taking it and then the nail fungus came back.  Informed pt that request for refill will be send to Podiatry.  No further questions or concerns at this time.

## 2025-04-24 ENCOUNTER — APPOINTMENT (OUTPATIENT)
Dept: PHYSICAL THERAPY | Facility: CLINIC | Age: 60
End: 2025-04-24
Payer: COMMERCIAL

## 2025-04-24 RX ORDER — TERBINAFINE HYDROCHLORIDE 250 MG/1
250 TABLET ORAL DAILY
OUTPATIENT
Start: 2025-04-24

## 2025-05-19 DIAGNOSIS — M25.552 LEFT HIP PAIN: Primary | ICD-10-CM

## 2025-05-19 RX ORDER — CELECOXIB 200 MG/1
200 CAPSULE ORAL DAILY
Qty: 90 CAPSULE | Refills: 1 | Status: SHIPPED | OUTPATIENT
Start: 2025-05-19

## 2025-06-02 RX ORDER — PNV,CALCIUM 72/IRON/FOLIC ACID 27 MG-1 MG
TABLET ORAL
COMMUNITY
Start: 2025-04-23 | End: 2025-06-10

## 2025-06-03 ENCOUNTER — OFFICE VISIT (OUTPATIENT)
Dept: OBGYN CLINIC | Facility: CLINIC | Age: 60
End: 2025-06-03

## 2025-06-03 ENCOUNTER — APPOINTMENT (OUTPATIENT)
Dept: RADIOLOGY | Facility: MEDICAL CENTER | Age: 60
End: 2025-06-03
Attending: STUDENT IN AN ORGANIZED HEALTH CARE EDUCATION/TRAINING PROGRAM
Payer: COMMERCIAL

## 2025-06-03 VITALS
HEIGHT: 70 IN | OXYGEN SATURATION: 98 % | RESPIRATION RATE: 16 BRPM | HEART RATE: 86 BPM | TEMPERATURE: 98.2 F | BODY MASS INDEX: 39.6 KG/M2 | WEIGHT: 276.6 LBS

## 2025-06-03 DIAGNOSIS — Z96.642 STATUS POST TOTAL REPLACEMENT OF LEFT HIP: Primary | ICD-10-CM

## 2025-06-03 PROCEDURE — 99024 POSTOP FOLLOW-UP VISIT: CPT | Performed by: STUDENT IN AN ORGANIZED HEALTH CARE EDUCATION/TRAINING PROGRAM

## 2025-06-03 PROCEDURE — 73502 X-RAY EXAM HIP UNI 2-3 VIEWS: CPT

## 2025-06-03 NOTE — PROGRESS NOTES
Post-Operative Note: Total Hip Arthroplasty  Brown Britt (60 y.o. male)  : 1965 Encounter Date: 6/3/2025  Dr. Jack Rogers DO, Orthopedic Surgeon  [facility]     :  Assessment & Plan  Status post total replacement of left hip  3 months follow up status post left posterior total hip arthroplasty   Wound Care   Continue current care.  Pain control: PRN  Continue ice packs/elevation  Can continue compression   Continue with physical therapy  Continue activities as tolerated  Maintain posterior hip precautions  Complete DVT ppx Completed ASA 81mg BID x 4weeks  Return in about 3-6 months  for evaluation with x-ray  Orders:    XR hip/pelv 2-3 vws left if performed      Subjective:  60 y.o. male  presenting to the office for 3 months follow up status post left posterior total hip arthroplasty (elective)  DOS: 3/10/2025    Reports resolution of hip pain, back pain, no longer taking celebrex    Current concerns: None  Pain: None reported  Assistive device: None  Numbness/weakness extremity: None Reported   Physical Therapy Progress: Completed  DVT ppx: ASA 81mg BID x 4 weeks  Abx: Completed 5 days of duricef  Eating/Drinking improving. Bowel/Bladder: WNL  Patient denies symptoms of an infection.     Physical Exam:   Exam: left hip  Incision: healing well no significant drainage no dehiscence no significant erythema  ROM: WNL  Sensation: normal   Brisk Capillary Refill  Positive tib ant, EHL, gastrocsoleus complex  Able to get up and out of a chair easily without any assistance    Imaging:  X-rays of left hip from 25 show well positioned arthroplasty with no evidence of failure or loosening of hardware       Piero CARIAS PA-C, scribed this note in conjunction with and in the presence of Dr. Jack Rogers DO, who performed parts of the services as described in this documentation

## 2025-06-10 ENCOUNTER — TELEPHONE (OUTPATIENT)
Dept: ADMINISTRATIVE | Facility: OTHER | Age: 60
End: 2025-06-10

## 2025-06-10 ENCOUNTER — OFFICE VISIT (OUTPATIENT)
Dept: FAMILY MEDICINE CLINIC | Facility: CLINIC | Age: 60
End: 2025-06-10
Payer: COMMERCIAL

## 2025-06-10 ENCOUNTER — TELEPHONE (OUTPATIENT)
Dept: OBGYN CLINIC | Facility: CLINIC | Age: 60
End: 2025-06-10

## 2025-06-10 VITALS
HEIGHT: 70 IN | DIASTOLIC BLOOD PRESSURE: 86 MMHG | SYSTOLIC BLOOD PRESSURE: 130 MMHG | OXYGEN SATURATION: 98 % | HEART RATE: 86 BPM | BODY MASS INDEX: 39.25 KG/M2 | TEMPERATURE: 97.8 F | WEIGHT: 274.2 LBS

## 2025-06-10 DIAGNOSIS — M16.0 OSTEOARTHRITIS OF BOTH HIPS, UNSPECIFIED OSTEOARTHRITIS TYPE: ICD-10-CM

## 2025-06-10 DIAGNOSIS — E11.65 TYPE 2 DIABETES MELLITUS WITH HYPERGLYCEMIA, WITHOUT LONG-TERM CURRENT USE OF INSULIN (HCC): ICD-10-CM

## 2025-06-10 DIAGNOSIS — I10 ESSENTIAL HYPERTENSION: ICD-10-CM

## 2025-06-10 DIAGNOSIS — F51.01 PRIMARY INSOMNIA: ICD-10-CM

## 2025-06-10 DIAGNOSIS — Z00.00 ANNUAL PHYSICAL EXAM: Primary | ICD-10-CM

## 2025-06-10 DIAGNOSIS — Z96.642 S/P TOTAL LEFT HIP ARTHROPLASTY: ICD-10-CM

## 2025-06-10 LAB — SL AMB POCT HEMOGLOBIN AIC: 6.5 (ref ?–6.5)

## 2025-06-10 PROCEDURE — 83036 HEMOGLOBIN GLYCOSYLATED A1C: CPT | Performed by: FAMILY MEDICINE

## 2025-06-10 PROCEDURE — 99396 PREV VISIT EST AGE 40-64: CPT | Performed by: FAMILY MEDICINE

## 2025-06-10 NOTE — LETTER
Diabetic Eye Exam Form    Date Requested: 06/10/25  Patient: Brown Britt  Patient : 1965   Referring Provider: Timo Castano,       DIABETIC Eye Exam Date _______________________________      Type of Exam MUST be documented for Diabetic Eye Exams. Please CHECK ONE.     Retinal Exam       Dilated Retinal Exam       OCT       Optomap-Iris Exam      Fundus Photography       Left Eye - Please check Retinopathy or No Retinopathy        Exam did show retinopathy    Exam did not show retinopathy       Right Eye - Please check Retinopathy or No Retinopathy       Exam did show retinopathy    Exam did not show retinopathy       Comments __________________________________________________________    Practice Providing Exam ______________________________________________    Exam Performed By (print name) _______________________________________      Provider Signature ___________________________________________________      These reports are needed for  compliance.    Please fax this completed form and a copy of the Diabetic Eye Exam report to the [facility] Based Department as soon as possible   via Fax [phone], attention Jelly: Phone [phone]. Our office is located at [address] [place] [address].     We thank you for your assistance in treating our mutual patient.

## 2025-06-10 NOTE — ASSESSMENT & PLAN NOTE
Lab Results   Component Value Date    HGBA1C 6.5 06/10/2025   The review of past hemoglobin A1c.  On February 20, 2025 it was 6.4.  On February 24, 2024 it was 6.5.,  On February 8, 2023 and improved to 5.9.  On 11/22/2022 it was stable at 6.0.  On February 2, 2022 it was 6.0 and stable.,  On 6/17/2021 it was 6.7.  On January 5, 2021 it was 8.4.  Considering the patient has had at least 2 values greater than 6.5 he is considered type 2 diabetes mellitus with hyperglycemia and currently does not use long-term insulin use.  In the past, prior to the gastric sleeve surgery the patient was on metformin and Trulicity.  After the gastric sleeve surgery he lost weight and his sugar improved to the point where he did not need medication.  His hemoglobin A1c is starting to rise again.  Today's hemoglobin A1c is now 6.5.    Patient and I discussed treatment options about restarting low-dose metformin versus a trial of dietary and lifestyle modification.  Patient prefers to do 3 months of lifestyle and dietary modification and then we will recheck the hemoglobin A1c.  Hopefully it will be lower.  If it is higher, then we may need to restart metformin the patient asked about Trulicity and other GLP agonists.  I did inform him that most insurance companies if not all require the patient to be on maximum dose metformin before they would even consider approving these medications    Patient's ophthalmologist is Dr. Melendez.  No evidence of diabetic retinopathy.    Orders:    Hemoglobin A1C; Future    Comprehensive metabolic panel; Future    CBC and differential; Future    Albumin / creatinine urine ratio; Future    Lipid Panel with Direct LDL reflex; Future    POCT hemoglobin A1c

## 2025-06-10 NOTE — LETTER
Diabetic Eye Exam Form    Date Requested: 25  Patient: Brwon Britt  Patient : 1965   Referring Provider: Timo Castano DO      DIABETIC Eye Exam Date _______________________________      Type of Exam MUST be documented for Diabetic Eye Exams. Please CHECK ONE.     Retinal Exam       Dilated Retinal Exam       OCT       Optomap-Iris Exam      Fundus Photography       Left Eye - Please check Retinopathy or No Retinopathy        Exam did show retinopathy    Exam did not show retinopathy       Right Eye - Please check Retinopathy or No Retinopathy       Exam did show retinopathy    Exam did not show retinopathy       Comments _Dr Melendez patient appx 2 mo ago _________________________________________________________    Practice Providing Exam ______________________________________________    Exam Performed By (print name) _______________________________________      Provider Signature ___________________________________________________      These reports are needed for  compliance.    Please fax this completed form and a copy of the Diabetic Eye Exam report to the [facility] Based Department as soon as possible   via Fax [phone], attention Jelly: Phone [phone]. Our office is located at [address] [place] [address].     We thank you for your assistance in treating our mutual patient.

## 2025-06-10 NOTE — TELEPHONE ENCOUNTER
Upon review of the In Basket request and the patient's chart, initial outreach has been made via fax to facility. Please see Contacts section for details.     X1 eye    Thank you  Jelly Lisa

## 2025-06-10 NOTE — ASSESSMENT & PLAN NOTE
Orthopedics and physical therapy following  Orders:    Hemoglobin A1C; Future    Comprehensive metabolic panel; Future    CBC and differential; Future    Albumin / creatinine urine ratio; Future    Lipid Panel with Direct LDL reflex; Future

## 2025-06-10 NOTE — TELEPHONE ENCOUNTER
Patient stopped in and was asking with his hip replacement does he need a card stating that it is MRI compatible, or does he need a card to go through airport security? Please advise.

## 2025-06-10 NOTE — PROGRESS NOTES
Name: Brown Britt      : 1965      MRN: [id number]  Encounter Provider: Timo Castano DO  Encounter Date: 6/10/2025   Encounter department: [facility]  :  Assessment & Plan  Annual physical exam         Type 2 diabetes mellitus with hyperglycemia, without long-term current use of insulin ([facility])    Lab Results   Component Value Date    HGBA1C 6.5 06/10/2025   The review of past hemoglobin A1c.  On 2025 it was 6.4.  On 2024 it was 6.5.,  On 2023 and improved to 5.9.  On 2022 it was stable at 6.0.  On 2022 it was 6.0 and stable.,  On 2021 it was 6.7.  On 2021 it was 8.4.  Considering the patient has had at least 2 values greater than 6.5 he is considered type 2 diabetes mellitus with hyperglycemia and currently does not use long-term insulin use.  In the past, prior to the gastric sleeve surgery the patient was on metformin and Trulicity.  After the gastric sleeve surgery he lost weight and his sugar improved to the point where he did not need medication.  His hemoglobin A1c is starting to rise again.  Today's hemoglobin A1c is now 6.5.    Patient and I discussed treatment options about restarting low-dose metformin versus a trial of dietary and lifestyle modification.  Patient prefers to do 3 months of lifestyle and dietary modification and then we will recheck the hemoglobin A1c.  Hopefully it will be lower.  If it is higher, then we may need to restart metformin the patient asked about Trulicity and other GLP agonists.  I did inform him that most insurance companies if not all require the patient to be on maximum dose metformin before they would even consider approving these medications    Patient's ophthalmologist is Dr. Melendez.  No evidence of diabetic retinopathy.    Orders:    Hemoglobin A1C; Future    Comprehensive metabolic panel; Future    CBC and differential; Future    Albumin / creatinine urine ratio; Future     Lipid Panel with Direct LDL reflex; Future    POCT hemoglobin A1c    Osteoarthritis of both hips, unspecified osteoarthritis type  Orthopedics following.  Patient is status post total left hip arthroplasty.  He is also in physical therapy.  Orders:    Hemoglobin A1C; Future    Comprehensive metabolic panel; Future    CBC and differential; Future    Albumin / creatinine urine ratio; Future    Lipid Panel with Direct LDL reflex; Future    Primary insomnia  Currently controlled.  Sleep hygiene discussed.  Orders:    Hemoglobin A1C; Future    Comprehensive metabolic panel; Future    CBC and differential; Future    Albumin / creatinine urine ratio; Future    Lipid Panel with Direct LDL reflex; Future    Essential hypertension  Blood pressure currently controlled.  Will continue current regimen, lifestyle and dietary modification  Orders:    Hemoglobin A1C; Future    Comprehensive metabolic panel; Future    CBC and differential; Future    Albumin / creatinine urine ratio; Future    Lipid Panel with Direct LDL reflex; Future    S/P total left hip arthroplasty  Orthopedics and physical therapy following  Orders:    Hemoglobin A1C; Future    Comprehensive metabolic panel; Future    CBC and differential; Future    Albumin / creatinine urine ratio; Future    Lipid Panel with Direct LDL reflex; Future      Last lab work reviewed was February 20, 2025.  PSA normal.  Total cholesterol 215, triglycerides 170 (trending down), HDL 47, , thyroid normal, complete metabolic panel revealed just a mildly elevated glucose at 106.  Liver function test normal, electrolytes normal and kidney function normal, CBC is normal, A1c is 6.4    Cologuard - 1/19/2025.  Repeat Cologuard due 1/9/2028..  Next PSA would be due 2/20/2026.     History of Present Illness   The patient is a pleasant 60-year-old male who presents today for his annual physical examination.  I also reviewed his medical history, any available lab work and diagnostic  "studies as well.  Please note the patient is status post left hip arthroplasty since the last visit.      Review of Systems   Constitutional:  Negative for chills and fever.   HENT:  Negative for ear pain and sore throat.    Eyes:  Negative for pain and visual disturbance.   Respiratory:  Negative for cough and shortness of breath.    Cardiovascular:  Negative for chest pain and palpitations.   Gastrointestinal:  Negative for abdominal pain and vomiting.   Genitourinary:  Negative for dysuria and hematuria.   Musculoskeletal:  Negative for arthralgias and back pain.   Skin:  Negative for color change and rash.   Neurological:  Negative for seizures and syncope.   All other systems reviewed and are negative.      Objective   /86 (BP Location: Left arm, Patient Position: Sitting, Cuff Size: Adult)   Pulse 86   Temp 97.8 °F (36.6 °C)   Ht 5' 10\" (1.778 m)   Wt 124 kg (274 lb 3.2 oz)   SpO2 98%   BMI 39.34 kg/m²      Physical Exam  Vitals and nursing note reviewed.   Constitutional:       General: He is not in acute distress.     Appearance: He is well-developed. He is not ill-appearing, toxic-appearing or diaphoretic.   HENT:      Head: Normocephalic and atraumatic.      Nose: Nose normal.     Eyes:      Conjunctiva/sclera: Conjunctivae normal.       Cardiovascular:      Rate and Rhythm: Normal rate and regular rhythm.      Pulses: Normal pulses. no weak pulses.           Dorsalis pedis pulses are 2+ on the right side and 2+ on the left side.        Posterior tibial pulses are 2+ on the right side and 2+ on the left side.      Heart sounds: Normal heart sounds. No murmur heard.  Pulmonary:      Effort: Pulmonary effort is normal. No respiratory distress.      Breath sounds: Normal breath sounds.   Abdominal:      Palpations: Abdomen is soft.      Tenderness: There is no abdominal tenderness.     Musculoskeletal:         General: No swelling.      Cervical back: Neck supple.   Feet:      Right foot:      " Skin integrity: No ulcer, skin breakdown, erythema, warmth, callus or dry skin.      Left foot:      Skin integrity: No ulcer, skin breakdown, erythema, warmth, callus or dry skin.     Skin:     General: Skin is warm and dry.      Capillary Refill: Capillary refill takes less than 2 seconds.     Neurological:      General: No focal deficit present.      Mental Status: He is alert and oriented to person, place, and time.     Psychiatric:         Mood and Affect: Mood normal.         Behavior: Behavior normal.         Thought Content: Thought content normal.     Patient's shoes and socks removed.    Right Foot/Ankle   Right Foot Inspection  Skin Exam: skin normal and skin intact. No dry skin, no warmth, no callus, no erythema, no maceration, no abnormal color, no pre-ulcer, no ulcer and no callus.     Toe Exam: ROM and strength within normal limits.     Sensory   Monofilament testing: intact    Vascular  Capillary refills: < 3 seconds  The right DP pulse is 2+. The right PT pulse is 2+.     Left Foot/Ankle  Left Foot Inspection  Skin Exam: skin normal and skin intact. No dry skin, no warmth, no erythema, no maceration, normal color, no pre-ulcer, no ulcer and no callus.     Toe Exam: ROM and strength within normal limits.     Sensory   Monofilament testing: intact    Vascular  Capillary refills: < 3 seconds  The left DP pulse is 2+. The left PT pulse is 2+.     Assign Risk Category  No deformity present  No loss of protective sensation  No weak pulses  Risk: 0

## 2025-06-10 NOTE — TELEPHONE ENCOUNTER
----- Message from Marly IBARRA sent at 6/10/2025  9:35 AM EDT -----  Regarding: diabetic eye  06/10/25 9:35 Berta, our patient Brown Britt has had Diabetic Eye Exam completed/performed. Please assist in updating the patient chart by making an External outreach to Dr Melendez facility located in [place]. The date of service is about 2 months ago.Thank you,Marly Rdz, [facility]

## 2025-06-10 NOTE — ASSESSMENT & PLAN NOTE
Currently controlled.  Sleep hygiene discussed.  Orders:    Hemoglobin A1C; Future    Comprehensive metabolic panel; Future    CBC and differential; Future    Albumin / creatinine urine ratio; Future    Lipid Panel with Direct LDL reflex; Future

## 2025-06-10 NOTE — ASSESSMENT & PLAN NOTE
Blood pressure currently controlled.  Will continue current regimen, lifestyle and dietary modification  Orders:    Hemoglobin A1C; Future    Comprehensive metabolic panel; Future    CBC and differential; Future    Albumin / creatinine urine ratio; Future    Lipid Panel with Direct LDL reflex; Future

## 2025-06-17 NOTE — TELEPHONE ENCOUNTER
As a follow-up, a second attempt has been made for outreach via fax to facility. Please see Contacts section for details.    X2 eye    Thank you  eJlly Lisa

## 2025-06-18 NOTE — TELEPHONE ENCOUNTER
Attempted to call patient, VM not set up , unable to leave message.  Sent cannot reach letter to patient to call office for answer to question he had

## 2025-06-25 NOTE — TELEPHONE ENCOUNTER
As a final attempt, a third outreach has been made via telephone call to facility. Please see Contacts section for details. This encounter will be closed and completed by end of day. Should we receive the requested information because of previous outreach attempts, the requested patient's chart will be updated appropriately.     Thank you  Jelly Lisa

## (undated) DEVICE — GLOVE SRG BIOGEL ECLIPSE 7.5

## (undated) DEVICE — HEAVY DUTY TABLE COVER: Brand: CONVERTORS

## (undated) DEVICE — CAPIT HIP UPCHRG GRIPTON CUP

## (undated) DEVICE — SUT STRATAFIX SPIRAL PLUS 3-0 PS-2 30 X 30 CM SXMP2B408

## (undated) DEVICE — 3M™ IOBAN™ 2 ANTIMICROBIAL INCISE DRAPE 6651EZ: Brand: IOBAN™ 2

## (undated) DEVICE — IMPERVIOUS SURGICAL GOWN, LG: Brand: CONVERTORS

## (undated) DEVICE — INTENDED FOR TISSUE SEPARATION, AND OTHER PROCEDURES THAT REQUIRE A SHARP SURGICAL BLADE TO PUNCTURE OR CUT.: Brand: BARD-PARKER ® CARBON RIB-BACK BLADES

## (undated) DEVICE — GLOVE SRG BIOGEL 6.5

## (undated) DEVICE — GLOVE PI ULTRA TOUCH SZ.7.0

## (undated) DEVICE — HOOD: Brand: T7PLUS

## (undated) DEVICE — LAPAROTOMY SPONGE - RF AND X-RAY DETECTABLE PRE-WASHED: Brand: SITUATE

## (undated) DEVICE — SYRINGE 20ML LL

## (undated) DEVICE — SUT STRATAFIX SPIRAL PDS PLUS 1 CTX 18 IN SXPP1A400

## (undated) DEVICE — EXOFIN PRECISION PEN HIGH VISCOSITY TOPICAL SKIN ADHESIVE: Brand: EXOFIN PRECISION PEN, 1G

## (undated) DEVICE — GLOVE SRG BIOGEL ECLIPSE 6.5

## (undated) DEVICE — PADDING CAST 6IN COTTON STRL

## (undated) DEVICE — GLOVE INDICATOR PI UNDERGLOVE SZ 6.5 BLUE

## (undated) DEVICE — SUT MONOCRYL 2-0 CT-1 36 IN Y945H

## (undated) DEVICE — HANDPIECE SET WITH RETRACTABLE COAXIAL FAN SPRAY TIP AND SUCTION TUBE: Brand: INTERPULSE

## (undated) DEVICE — PADDING CAST 4 IN  COTTON STRL

## (undated) DEVICE — ACE WRAP 6 IN STERILE

## (undated) DEVICE — GLOVE PI ULTRA TOUCH SZ.6.5

## (undated) DEVICE — GLOVE SRG BIOGEL 8

## (undated) DEVICE — ELECTRODE BLADE MOD  E-Z CLEAN 6.5IN -0014M

## (undated) DEVICE — CAPIT HIP COP -CMNT/POR-ACTIS

## (undated) DEVICE — THREE-QUARTER SHEET: Brand: CONVERTORS

## (undated) DEVICE — ACE WRAP 6 IN XL STERILE

## (undated) DEVICE — BETHLEHEM UNIV MAJ EXT ,KIT: Brand: CARDINAL HEALTH

## (undated) DEVICE — GLOVE SRG BIOGEL 7.5

## (undated) DEVICE — NEEDLE 18 G X 1 1/2

## (undated) DEVICE — SUT ETHIBOND 2 V-37 30 IN MX69GA

## (undated) DEVICE — SUT VICRYL 1 CT-1 36 IN J947H

## (undated) DEVICE — DRESSING MEPILEX BORDER 4 X 8 IN

## (undated) DEVICE — PENCIL ELECTROSURG E-Z CLEAN -0035H

## (undated) DEVICE — HOOD WITH PEEL AWAY FACE SHIELD: Brand: T7PLUS

## (undated) DEVICE — DUAL CUT SAGITTAL BLADE